# Patient Record
Sex: MALE | Race: WHITE | NOT HISPANIC OR LATINO | Employment: FULL TIME | URBAN - METROPOLITAN AREA
[De-identification: names, ages, dates, MRNs, and addresses within clinical notes are randomized per-mention and may not be internally consistent; named-entity substitution may affect disease eponyms.]

---

## 2017-01-11 ENCOUNTER — TELEPHONE (OUTPATIENT)
Dept: CARDIOLOGY | Facility: CLINIC | Age: 52
End: 2017-01-11

## 2017-01-23 ENCOUNTER — ANTI-COAG VISIT (OUTPATIENT)
Dept: CARDIOLOGY | Facility: CLINIC | Age: 52
End: 2017-01-23

## 2017-01-23 ENCOUNTER — TELEPHONE (OUTPATIENT)
Dept: CARDIOLOGY | Facility: CLINIC | Age: 52
End: 2017-01-23

## 2017-01-23 NOTE — LETTER
January 23, 2017    Mari Peters  [address]  [facility] [address]             [facility]  [address]  [facility] [address]  Phone: [phone]  Fax: [phone] Dear Mr. Peters:         The [facility] has been unsuccessful in getting in touch with you regarding your missed appointment for a PT/INR check. Your appointment is at least a month overdue. Despite phone calls and mail sent to your address on file, you have not called our clinic to reschedule your appointment. It is very important that you are monitored regularly while on Coumadin ( Warfarin).          You have been discharged from the Coumadin Clinic. Your physician has been notified of your discharge. If you still require monitoring for your Coumadin (Warfarin) therapy, please call your physician immediately so that you can resume regular monitoring. If the physician recommends that our clinic continue to monitor your Coumadin (Warfarin) therapy, a new enrollment form will be required before we can resume care. If you are no longer on Coumadin (Warfarin) or no longer require monitoring by our clinic, please contact our clinic so we may update or records.        If you have any questions or concerns, please don't hesitate to call.    Sincerely,        [facility] Staff

## 2017-01-23 NOTE — PROGRESS NOTES
After several unsuccessful attempts to contact patient by phone and mail, patient will be discharged from clinic, per non compliance protocol. Physician and patient will be notified.

## 2019-03-25 PROBLEM — M25.331 SCAPHOLUNATE INSTABILITY OF RIGHT WRIST: Status: ACTIVE | Noted: 2019-03-25

## 2019-03-25 PROBLEM — S69.91XA INJURY OF RIGHT WRIST: Status: ACTIVE | Noted: 2019-03-25

## 2019-03-29 PROBLEM — S63.8X1A TEAR OF RIGHT SCAPHOLUNATE LIGAMENT: Status: ACTIVE | Noted: 2019-03-29

## 2019-09-09 PROBLEM — S63.8X1A TEAR OF RIGHT SCAPHOLUNATE LIGAMENT: Status: RESOLVED | Noted: 2019-03-29 | Resolved: 2019-09-09

## 2020-07-22 ENCOUNTER — OFFICE VISIT (OUTPATIENT)
Dept: ORTHOPEDICS | Facility: CLINIC | Age: 55
End: 2020-07-22
Payer: OTHER GOVERNMENT

## 2020-07-22 ENCOUNTER — HOSPITAL ENCOUNTER (OUTPATIENT)
Dept: RADIOLOGY | Facility: HOSPITAL | Age: 55
Discharge: HOME | End: 2020-07-22
Attending: ORTHOPAEDIC SURGERY
Payer: OTHER GOVERNMENT

## 2020-07-22 VITALS
TEMPERATURE: 98 F | WEIGHT: 201.94 LBS | HEIGHT: 68 IN | BODY MASS INDEX: 30.61 KG/M2 | SYSTOLIC BLOOD PRESSURE: 118 MMHG | DIASTOLIC BLOOD PRESSURE: 83 MMHG | HEART RATE: 95 BPM

## 2020-07-22 DIAGNOSIS — S63.8X1S TEAR OF RIGHT SCAPHOLUNATE LIGAMENT, SEQUELA: ICD-10-CM

## 2020-07-22 DIAGNOSIS — M25.531 WRIST PAIN, ACUTE, RIGHT: ICD-10-CM

## 2020-07-22 DIAGNOSIS — S69.81XA INJURY OF TRIANGULAR FIBROCARTILAGE COMPLEX (TFCC) OF RIGHT WRIST, INITIAL ENCOUNTER: ICD-10-CM

## 2020-07-22 DIAGNOSIS — M25.531 WRIST PAIN, ACUTE, RIGHT: Primary | ICD-10-CM

## 2020-07-22 PROCEDURE — 99999 PR PBB SHADOW E&M-NEW PATIENT-LVL IV: ICD-10-PCS | Mod: PBBFAC,,, | Performed by: ORTHOPAEDIC SURGERY

## 2020-07-22 PROCEDURE — 99204 OFFICE O/P NEW MOD 45 MIN: CPT | Mod: PBBFAC,25,PN | Performed by: ORTHOPAEDIC SURGERY

## 2020-07-22 PROCEDURE — 73110 XR WRIST COMPLETE 3 VIEWS RIGHT: ICD-10-PCS | Mod: 26,RT,, | Performed by: RADIOLOGY

## 2020-07-22 PROCEDURE — 99204 OFFICE O/P NEW MOD 45 MIN: CPT | Mod: S$PBB,,, | Performed by: ORTHOPAEDIC SURGERY

## 2020-07-22 PROCEDURE — 73110 X-RAY EXAM OF WRIST: CPT | Mod: 26,RT,, | Performed by: RADIOLOGY

## 2020-07-22 PROCEDURE — 73110 X-RAY EXAM OF WRIST: CPT | Mod: TC,PO,RT

## 2020-07-22 PROCEDURE — 99204 PR OFFICE/OUTPT VISIT, NEW, LEVL IV, 45-59 MIN: ICD-10-PCS | Mod: S$PBB,,, | Performed by: ORTHOPAEDIC SURGERY

## 2020-07-22 PROCEDURE — 99999 PR PBB SHADOW E&M-NEW PATIENT-LVL IV: CPT | Mod: PBBFAC,,, | Performed by: ORTHOPAEDIC SURGERY

## 2020-07-22 NOTE — PROGRESS NOTES
7/22/2020    Chief Complaint:  Chief Complaint   Patient presents with    Right wrist pain     pt reports he had right scapholunate repair 3/25/20 by Dr. Jhaveri.       HPI:  Mari Peters is a 54 y.o. male, who presents to clinic today has a history of right scapholunate ligament injury.  This occurred approximately 2 years ago.  In March of 2019 he underwent a scapholunate ligament repair.  Since that time he has had difficulty with regaining range of motion and function.  He is still having severe limitation of motion and pain in the wrist.  He has undergone courses of therapy but his insurance has changed recently and he is trying to get further evaluation for his hand and wrist to assess for any further treatment options.    PMHX:  Past Medical History:   Diagnosis Date    Anticoagulant long-term use     eliquis    Colon polyps 2012    suspicious,  pending further w/u     Depression     Diabetes mellitus     Diabetes mellitus, type 2 2012    History of pulmonary embolism     HTN (hypertension) 2/7/2014    Hyperlipidemia     Hypothyroidism     Injury of right wrist 03/2019    Thromboembolism Jan 2014    bilateral LE blood clots, also diffuse pulmonary blood clots    Thyroid disease        PSHX:  Past Surgical History:   Procedure Laterality Date    ANKLE SURGERY Right     RECONSTRUCTION OF LIGAMENT Right 3/25/2019    Procedure: RIGHT SCAPHOLUNATE INTEROSSEOUS LIGAMENT RECONSTRUCTION with FLEXOR CARPI RADIALIS;  Surgeon: LALA Corbin MD;  Location: [facility];  Service: Orthopedics;  Laterality: Right;    TENDON TRANSFER Right 3/25/2019    Procedure: TRANSFER, TENDON/ FCR;  Surgeon: LALA Corbin MD;  Location: [facility];  Service: Orthopedics;  Laterality: Right;       FMHX:  Family History   Problem Relation Age of Onset    Diabetes Mother     Heart disease Mother     COPD Mother        SOCHX:  Social History     Tobacco Use    Smoking status: Never Smoker    Smokeless tobacco:  "Current User     Types: Chew    Tobacco comment: 2 pk per day of chew   Substance Use Topics    Alcohol use: Yes     Comment: 2-3 times per yr       ALLERGIES:  Bee sting [allergen ext-venom-honey bee]    CURRENT MEDICATIONS:  Current Outpatient Medications on File Prior to Visit   Medication Sig Dispense Refill    apixaban (ELIQUIS) 5 mg Tab Take 5 mg by mouth 2 (two) times daily. Stop per cardiologist instructions      atorvastatin (LIPITOR) 10 MG tablet Take 1 tablet (10 mg total) by mouth once daily. (Patient taking differently: Take 10 mg by mouth once daily. TAKE AM OF SURGERY WITH A SIP OF WATER) 30 tablet 6    blood sugar diagnostic Strp Test strips and lancets of pt choice 100 strip 12    dextroamphetamine-amphetamine (ADDERALL XR) 20 MG 24 hr capsule Take 20 mg by mouth daily as needed. HOLD AM OF SURGERY      ergocalciferol (ERGOCALCIFEROL) 50,000 unit Cap Take 50,000 Units by mouth every 7 days.      escitalopram oxalate (LEXAPRO) 10 MG tablet Take 1 tablet (10 mg total) by mouth once daily. (Patient taking differently: Take 10 mg by mouth once daily. TAKE AM OF SURGERY WITH A SIP OF WATER) 30 tablet 6    insulin (BASAGLAR KWIKPEN U-100 INSULIN) glargine 100 units/mL (3mL) SubQ pen Inject 20 Units into the skin once daily. 10 UNITS AM OF SURGERY      insulin lispro 100 unit/mL injection Inject into the skin 3 (three) times daily before meals. Sliding scale      insulin needles, disposable, (BD ULTRA-FINE FIGUEROA PEN NEEDLES) 32 x 5/32 " Ndle Pt uses with levemir and novolog up to QID. 100 each 3    lancets (ACCU-CHEK SOFTCLIX LANCETS) Misc 1 Bottle by Misc.(Non-Drug; Combo Route) route 3 (three) times daily as needed. 1 each 3    levothyroxine (SYNTHROID) 100 MCG tablet Take 100 mcg by mouth once daily. TAKE AM OF SURGERY WITH A SIP OF WATER      linagliptin (TRADJENTA) 5 mg Tab tablet Take 5 mg by mouth once daily. DO NOT TAKE NIGHT BEFORE OR AM OF SURGERY      lisinopril (PRINIVIL,ZESTRIL) " "5 MG tablet Take 2 tablets (10 mg total) by mouth once daily. (Patient taking differently: Take 10 mg by mouth once daily. DO NOT TAKE NIGHT BEFORE OR AM OF SURGERY) 30 tablet 11    traZODone (DESYREL) 50 MG tablet Take 50 mg by mouth nightly as needed for Insomnia.      gabapentin (NEURONTIN) 300 MG capsule Take 300 mg by mouth 3 (three) times daily. TAKE AM OF SURGERY WITH A SIP OF WATER      glipiZIDE (GLUCOTROL) 5 MG tablet Take 1 tablet (5 mg total) by mouth daily with breakfast. (Patient taking differently: Take 5 mg by mouth daily with breakfast. DO NOT TAKE NIGHT BEFORE OR AM OF SURGERY) 30 tablet 6     No current facility-administered medications on file prior to visit.        REVIEW OF SYSTEMS:  Review of Systems   Constitutional: Negative.    HENT: Negative.    Eyes: Negative.    Respiratory: Negative.    Cardiovascular: Negative.    Gastrointestinal: Negative.    Genitourinary: Negative.    Musculoskeletal: Positive for joint pain. Negative for back pain, falls, myalgias and neck pain.   Skin: Negative.    Neurological: Positive for tremors and focal weakness. Negative for dizziness, tingling, sensory change, speech change, seizures, loss of consciousness, weakness and headaches.   Endo/Heme/Allergies: Positive for polydipsia. Negative for environmental allergies. Bruises/bleeds easily.   Psychiatric/Behavioral: Negative for depression, hallucinations, memory loss, substance abuse and suicidal ideas. The patient is nervous/anxious and has insomnia.        GENERAL PHYSICAL EXAM:   /83   Pulse 95   Temp 98.2 °F (36.8 °C)   Ht 5' 8" (1.727 m)   Wt 91.6 kg (201 lb 15.1 oz)   BMI 30.71 kg/m²    GEN: well developed, well nourished, no acute distress   HENT: Normocephalic, atraumatic   EYES: No discharge, conjunctiva normal   NECK: Supple, non-tender   PULM: No wheezing, no respiratory distress   CV: RRR   ABD: Soft, non-tender    ORTHO EXAM:   Examination the right hand and wrist reveals that " there is a well-healed incision over the dorsum of the wrist.  There is no edema.  Palpation still produces global tenderness about the wrist which is most significant overlying the region of the distal TFCC and ulnar styloid.  He has mild radial sided tenderness noted.  Range of motion of the wrist is limited with extension of 60° and flexion of 50°.  He has 70° of supination and only 10° of pronation.  He is able make a full composite fist and fully extend the fingers.  He does report intact sensation in the median radial and ulnar distributions    RADIOLOGY:   X-rays of the right wrist were taken in clinic today.  The films were reviewed by me.  There is noted to be evidence of a scapholunate ligament repair.  There is unfortunately room repeat widening of the scapholunate interval.  There is also a DISI deformity noted.  There are some degenerative changes noted.  There is mild widening of the distal radial ulnar joint as well.  There is no significant dorsal displacement    ASSESSMENT:   Recurrent right scapholunate ligament tear, possible TFCC tear    PLAN:  1.  Will have the patient obtain his recent MRI for me to further evaluate prior to considering any treatment options    2.  I do feel like this patient has a significant injury to his wrist with severe limitation of motion that he may not recover from.  This may result in a permanent disability to his right hand and wrist    3.  Will follow up with me as soon as his MRI is available

## 2020-07-29 ENCOUNTER — OFFICE VISIT (OUTPATIENT)
Dept: ORTHOPEDICS | Facility: CLINIC | Age: 55
End: 2020-07-29
Payer: OTHER GOVERNMENT

## 2020-07-29 VITALS
DIASTOLIC BLOOD PRESSURE: 81 MMHG | HEIGHT: 68 IN | WEIGHT: 201.94 LBS | SYSTOLIC BLOOD PRESSURE: 114 MMHG | HEART RATE: 102 BPM | BODY MASS INDEX: 30.61 KG/M2

## 2020-07-29 DIAGNOSIS — S69.81XA INJURY OF TRIANGULAR FIBROCARTILAGE COMPLEX (TFCC) OF RIGHT WRIST, INITIAL ENCOUNTER: ICD-10-CM

## 2020-07-29 DIAGNOSIS — M25.331 SCAPHOLUNATE INSTABILITY OF RIGHT WRIST: Primary | ICD-10-CM

## 2020-07-29 PROCEDURE — 99213 OFFICE O/P EST LOW 20 MIN: CPT | Mod: PBBFAC,PN | Performed by: ORTHOPAEDIC SURGERY

## 2020-07-29 PROCEDURE — 99213 OFFICE O/P EST LOW 20 MIN: CPT | Mod: S$PBB,,, | Performed by: ORTHOPAEDIC SURGERY

## 2020-07-29 PROCEDURE — 99999 PR PBB SHADOW E&M-EST. PATIENT-LVL III: ICD-10-PCS | Mod: PBBFAC,,, | Performed by: ORTHOPAEDIC SURGERY

## 2020-07-29 PROCEDURE — 99999 PR PBB SHADOW E&M-EST. PATIENT-LVL III: CPT | Mod: PBBFAC,,, | Performed by: ORTHOPAEDIC SURGERY

## 2020-07-29 PROCEDURE — 99213 PR OFFICE/OUTPT VISIT, EST, LEVL III, 20-29 MIN: ICD-10-PCS | Mod: S$PBB,,, | Performed by: ORTHOPAEDIC SURGERY

## 2020-08-03 NOTE — PROGRESS NOTES
Mr Peters returns to clinic today.  Has a history of right wrist trauma which she sustained a scapholunate ligament tear TFCC tear.  He is returning for evaluation of his MRI discussion treatment options.  He still complains of right wrist and hand limited motion with limited strength as well physical exam:  Examination the right wrist hand reveals that there is well-healed incision.  There is no significant edema.  He does have prominence of the distal ulna styloid.  Palpation does produce tenderness overlying region of the distal ulna and TFCC.  He has mild radial sided tenderness.  Range of motion of the wrist with extension of 50° and flexion of 50°.  He has 70° of supination and 10° of pronation.  He is able make a full composite fist.  Sensation is grossly intact in the median radial ulnar distribution.  Has capillary refill less than 2 sec in all the digits.    Radiology:  MRI of right wrist has been reviewed.  There is noted to be significant widening of the scapholunate interval.  There is evidence a scapholunate ligament reconstruction.  There is some remaining tendon crossing the scapholunate interval but a significant portion appears to be torn or stretched.  There is also noted to be widening of the distal radial ulnar joint.  There is what appears to be a full-thickness tear of the TFCC as foveal attachment    Assessment:  Right wrist scapholunate ligament tear with DISI deformity, right wrist TFCC tear    Plan:    1.  Had a long discussion with the patient today about treatment options.  I have discussed attempt set proximal row carpectomy, wrist fusion, and continue conservative treatments.  Patient states that he is unsure as to whether he would like undergo any further surgical procedure.    2.  I do feel like the patient does have limitations to his right wrist which will limit his function.  He will most likely have limited function of the right wrist and hand despite further treatments.  It  may be beneficial to obtain either functional capacity exam or the patient decides not to proceed with any surgical procedure he may benefit an impairment rating.    3.  He will follow up with me on p.r.n. basis based off of his decision for further treatment.

## 2020-09-09 ENCOUNTER — OFFICE VISIT (OUTPATIENT)
Dept: ORTHOPEDICS | Facility: CLINIC | Age: 55
End: 2020-09-09
Payer: OTHER GOVERNMENT

## 2020-09-09 VITALS
HEART RATE: 83 BPM | SYSTOLIC BLOOD PRESSURE: 119 MMHG | BODY MASS INDEX: 30.61 KG/M2 | HEIGHT: 68 IN | WEIGHT: 201.94 LBS | DIASTOLIC BLOOD PRESSURE: 80 MMHG

## 2020-09-09 DIAGNOSIS — M25.331 SCAPHOLUNATE INSTABILITY OF RIGHT WRIST: Primary | ICD-10-CM

## 2020-09-09 DIAGNOSIS — S63.591D COMPLEX TEAR OF TRIANGULAR FIBROCARTILAGE OF RIGHT WRIST, SUBSEQUENT ENCOUNTER: ICD-10-CM

## 2020-09-09 PROCEDURE — 99999 PR PBB SHADOW E&M-EST. PATIENT-LVL III: CPT | Mod: PBBFAC,,, | Performed by: ORTHOPAEDIC SURGERY

## 2020-09-09 PROCEDURE — 99213 PR OFFICE/OUTPT VISIT, EST, LEVL III, 20-29 MIN: ICD-10-PCS | Mod: S$PBB,,, | Performed by: ORTHOPAEDIC SURGERY

## 2020-09-09 PROCEDURE — 99999 PR PBB SHADOW E&M-EST. PATIENT-LVL III: ICD-10-PCS | Mod: PBBFAC,,, | Performed by: ORTHOPAEDIC SURGERY

## 2020-09-09 PROCEDURE — 99213 OFFICE O/P EST LOW 20 MIN: CPT | Mod: PBBFAC,PN | Performed by: ORTHOPAEDIC SURGERY

## 2020-09-09 PROCEDURE — 99213 OFFICE O/P EST LOW 20 MIN: CPT | Mod: S$PBB,,, | Performed by: ORTHOPAEDIC SURGERY

## 2021-03-11 ENCOUNTER — OFFICE VISIT (OUTPATIENT)
Dept: ORTHOPEDICS | Facility: CLINIC | Age: 56
End: 2021-03-11
Payer: OTHER GOVERNMENT

## 2021-03-11 VITALS
WEIGHT: 201.81 LBS | HEIGHT: 68 IN | DIASTOLIC BLOOD PRESSURE: 89 MMHG | RESPIRATION RATE: 20 BRPM | SYSTOLIC BLOOD PRESSURE: 141 MMHG | BODY MASS INDEX: 30.59 KG/M2 | HEART RATE: 97 BPM

## 2021-03-11 DIAGNOSIS — M25.331 SCAPHOLUNATE INSTABILITY OF RIGHT WRIST: Primary | ICD-10-CM

## 2021-03-11 PROCEDURE — 99214 OFFICE O/P EST MOD 30 MIN: CPT | Mod: PBBFAC,PN | Performed by: ORTHOPAEDIC SURGERY

## 2021-03-11 PROCEDURE — 20605 INTERMEDIATE JOINT ASPIRATION/INJECTION: R RADIOCARPAL: ICD-10-PCS | Mod: S$PBB,RT,, | Performed by: ORTHOPAEDIC SURGERY

## 2021-03-11 PROCEDURE — 99999 PR PBB SHADOW E&M-EST. PATIENT-LVL IV: CPT | Mod: PBBFAC,,, | Performed by: ORTHOPAEDIC SURGERY

## 2021-03-11 PROCEDURE — 99999 PR PBB SHADOW E&M-EST. PATIENT-LVL IV: ICD-10-PCS | Mod: PBBFAC,,, | Performed by: ORTHOPAEDIC SURGERY

## 2021-03-11 PROCEDURE — 99213 OFFICE O/P EST LOW 20 MIN: CPT | Mod: 25,S$PBB,, | Performed by: ORTHOPAEDIC SURGERY

## 2021-03-11 PROCEDURE — 99213 PR OFFICE/OUTPT VISIT, EST, LEVL III, 20-29 MIN: ICD-10-PCS | Mod: 25,S$PBB,, | Performed by: ORTHOPAEDIC SURGERY

## 2021-03-11 PROCEDURE — 20605 DRAIN/INJ JOINT/BURSA W/O US: CPT | Mod: PBBFAC,PN | Performed by: ORTHOPAEDIC SURGERY

## 2021-03-11 RX ADMIN — TRIAMCINOLONE ACETONIDE 40 MG: 40 INJECTION, SUSPENSION INTRA-ARTICULAR; INTRAMUSCULAR at 11:03

## 2021-03-12 RX ORDER — TRIAMCINOLONE ACETONIDE 40 MG/ML
40 INJECTION, SUSPENSION INTRA-ARTICULAR; INTRAMUSCULAR
Status: DISCONTINUED | OUTPATIENT
Start: 2021-03-11 | End: 2021-03-12 | Stop reason: HOSPADM

## 2021-03-31 ENCOUNTER — TELEPHONE (OUTPATIENT)
Dept: ORTHOPEDICS | Facility: CLINIC | Age: 56
End: 2021-03-31

## 2021-04-07 ENCOUNTER — TELEPHONE (OUTPATIENT)
Dept: ORTHOPEDICS | Facility: CLINIC | Age: 56
End: 2021-04-07

## 2021-05-04 ENCOUNTER — PATIENT MESSAGE (OUTPATIENT)
Dept: RESEARCH | Facility: HOSPITAL | Age: 56
End: 2021-05-04

## 2021-07-01 ENCOUNTER — OFFICE VISIT (OUTPATIENT)
Dept: ENDOCRINOLOGY | Facility: CLINIC | Age: 56
End: 2021-07-01
Payer: OTHER GOVERNMENT

## 2021-07-01 VITALS
HEIGHT: 68 IN | BODY MASS INDEX: 30.69 KG/M2 | SYSTOLIC BLOOD PRESSURE: 124 MMHG | OXYGEN SATURATION: 98 % | HEART RATE: 82 BPM | DIASTOLIC BLOOD PRESSURE: 86 MMHG

## 2021-07-01 DIAGNOSIS — E11.65 TYPE 2 DIABETES MELLITUS WITH HYPERGLYCEMIA, WITH LONG-TERM CURRENT USE OF INSULIN: Primary | ICD-10-CM

## 2021-07-01 DIAGNOSIS — E03.9 ACQUIRED HYPOTHYROIDISM: ICD-10-CM

## 2021-07-01 DIAGNOSIS — Z79.4 TYPE 2 DIABETES MELLITUS WITH HYPERGLYCEMIA, WITH LONG-TERM CURRENT USE OF INSULIN: Primary | ICD-10-CM

## 2021-07-01 DIAGNOSIS — E78.5 HYPERLIPIDEMIA LDL GOAL <70: ICD-10-CM

## 2021-07-01 DIAGNOSIS — I10 ESSENTIAL HYPERTENSION: ICD-10-CM

## 2021-07-01 PROCEDURE — 99214 OFFICE O/P EST MOD 30 MIN: CPT | Mod: PBBFAC,PO | Performed by: INTERNAL MEDICINE

## 2021-07-01 PROCEDURE — 99205 OFFICE O/P NEW HI 60 MIN: CPT | Mod: S$PBB,,, | Performed by: INTERNAL MEDICINE

## 2021-07-01 PROCEDURE — 99205 PR OFFICE/OUTPT VISIT, NEW, LEVL V, 60-74 MIN: ICD-10-PCS | Mod: S$PBB,,, | Performed by: INTERNAL MEDICINE

## 2021-07-01 PROCEDURE — 99999 PR PBB SHADOW E&M-EST. PATIENT-LVL IV: ICD-10-PCS | Mod: PBBFAC,,, | Performed by: INTERNAL MEDICINE

## 2021-07-01 PROCEDURE — 99999 PR PBB SHADOW E&M-EST. PATIENT-LVL IV: CPT | Mod: PBBFAC,,, | Performed by: INTERNAL MEDICINE

## 2021-07-01 RX ORDER — ALOGLIPTIN 25 MG/1
25 TABLET, FILM COATED ORAL DAILY
Qty: 90 TABLET | Refills: 3 | Status: SHIPPED | OUTPATIENT
Start: 2021-07-01

## 2021-07-01 RX ORDER — INSULIN GLARGINE 100 [IU]/ML
30 INJECTION, SOLUTION SUBCUTANEOUS DAILY
Qty: 45 ML | Refills: 4
Start: 2021-07-01

## 2021-07-01 RX ORDER — PEN NEEDLE, DIABETIC 30 GX3/16"
NEEDLE, DISPOSABLE MISCELLANEOUS
Qty: 400 EACH | Refills: 4 | Status: SHIPPED | OUTPATIENT
Start: 2021-07-01

## 2021-07-01 RX ORDER — INSULIN ASPART 100 [IU]/ML
INJECTION, SOLUTION INTRAVENOUS; SUBCUTANEOUS
Qty: 30 ML | Refills: 5 | Status: SHIPPED | OUTPATIENT
Start: 2021-07-01

## 2021-07-01 RX ORDER — DAPAGLIFLOZIN 5 MG/1
5 TABLET, FILM COATED ORAL DAILY
Qty: 90 TABLET | Refills: 3 | Status: SHIPPED | OUTPATIENT
Start: 2021-07-01

## 2021-07-01 RX ORDER — INSULIN LISPRO 100 [IU]/ML
INJECTION, SOLUTION INTRAVENOUS; SUBCUTANEOUS
Qty: 30 ML | Refills: 5 | Status: SHIPPED | OUTPATIENT
Start: 2021-07-01 | End: 2021-07-01

## 2021-07-01 RX ORDER — INSULIN GLARGINE 100 [IU]/ML
30 INJECTION, SOLUTION SUBCUTANEOUS DAILY
Qty: 45 ML | Refills: 4 | Status: SHIPPED | OUTPATIENT
Start: 2021-07-01 | End: 2021-07-01

## 2021-07-07 ENCOUNTER — TELEPHONE (OUTPATIENT)
Dept: ADMINISTRATIVE | Facility: HOSPITAL | Age: 56
End: 2021-07-07

## 2021-09-22 ENCOUNTER — PATIENT MESSAGE (OUTPATIENT)
Dept: ENDOCRINOLOGY | Facility: CLINIC | Age: 56
End: 2021-09-22

## 2021-09-22 ENCOUNTER — TELEPHONE (OUTPATIENT)
Dept: ENDOCRINOLOGY | Facility: CLINIC | Age: 56
End: 2021-09-22

## 2021-11-01 ENCOUNTER — OFFICE VISIT (OUTPATIENT)
Dept: ORTHOPEDICS | Facility: CLINIC | Age: 56
End: 2021-11-01
Payer: OTHER GOVERNMENT

## 2021-11-01 ENCOUNTER — HOSPITAL ENCOUNTER (OUTPATIENT)
Dept: RADIOLOGY | Facility: HOSPITAL | Age: 56
Discharge: HOME | End: 2021-11-01
Attending: ORTHOPAEDIC SURGERY
Payer: OTHER GOVERNMENT

## 2021-11-01 VITALS
HEART RATE: 120 BPM | BODY MASS INDEX: 30.46 KG/M2 | SYSTOLIC BLOOD PRESSURE: 132 MMHG | HEIGHT: 68 IN | DIASTOLIC BLOOD PRESSURE: 82 MMHG | WEIGHT: 201 LBS

## 2021-11-01 DIAGNOSIS — M19.131 SLAC (SCAPHOLUNATE ADVANCED COLLAPSE) OF WRIST, RIGHT: ICD-10-CM

## 2021-11-01 DIAGNOSIS — M19.131 SLAC (SCAPHOLUNATE ADVANCED COLLAPSE) OF WRIST, RIGHT: Primary | ICD-10-CM

## 2021-11-01 PROCEDURE — 20605 DRAIN/INJ JOINT/BURSA W/O US: CPT | Mod: PBBFAC,PN,RT | Performed by: ORTHOPAEDIC SURGERY

## 2021-11-01 PROCEDURE — 99999 PR PBB SHADOW E&M-EST. PATIENT-LVL IV: CPT | Mod: PBBFAC,,, | Performed by: ORTHOPAEDIC SURGERY

## 2021-11-01 PROCEDURE — 99214 OFFICE O/P EST MOD 30 MIN: CPT | Mod: PBBFAC,PN,25 | Performed by: ORTHOPAEDIC SURGERY

## 2021-11-01 PROCEDURE — 99214 PR OFFICE/OUTPT VISIT, EST, LEVL IV, 30-39 MIN: ICD-10-PCS | Mod: 25,S$PBB,, | Performed by: ORTHOPAEDIC SURGERY

## 2021-11-01 PROCEDURE — 99999 PR PBB SHADOW E&M-EST. PATIENT-LVL IV: ICD-10-PCS | Mod: PBBFAC,,, | Performed by: ORTHOPAEDIC SURGERY

## 2021-11-01 PROCEDURE — 20605 INTERMEDIATE JOINT ASPIRATION/INJECTION: R RADIOCARPAL: ICD-10-PCS | Mod: S$PBB,RT,, | Performed by: ORTHOPAEDIC SURGERY

## 2021-11-01 PROCEDURE — 73110 X-RAY EXAM OF WRIST: CPT | Mod: TC,PO,RT

## 2021-11-01 PROCEDURE — 73110 XR WRIST COMPLETE 3 VIEWS RIGHT: ICD-10-PCS | Mod: 26,RT,, | Performed by: RADIOLOGY

## 2021-11-01 PROCEDURE — 99214 OFFICE O/P EST MOD 30 MIN: CPT | Mod: 25,S$PBB,, | Performed by: ORTHOPAEDIC SURGERY

## 2021-11-01 PROCEDURE — 73110 X-RAY EXAM OF WRIST: CPT | Mod: 26,RT,, | Performed by: RADIOLOGY

## 2021-11-01 RX ORDER — TRIAMCINOLONE ACETONIDE 40 MG/ML
40 INJECTION, SUSPENSION INTRA-ARTICULAR; INTRAMUSCULAR
Status: DISCONTINUED | OUTPATIENT
Start: 2021-11-01 | End: 2021-11-01 | Stop reason: HOSPADM

## 2021-11-01 RX ADMIN — TRIAMCINOLONE ACETONIDE 40 MG: 40 INJECTION, SUSPENSION INTRA-ARTICULAR; INTRAMUSCULAR at 11:11

## 2022-03-21 ENCOUNTER — OFFICE VISIT (OUTPATIENT)
Dept: ORTHOPEDICS | Facility: CLINIC | Age: 57
End: 2022-03-21
Payer: OTHER GOVERNMENT

## 2022-03-21 VITALS
HEART RATE: 118 BPM | BODY MASS INDEX: 30.47 KG/M2 | HEIGHT: 68 IN | SYSTOLIC BLOOD PRESSURE: 126 MMHG | DIASTOLIC BLOOD PRESSURE: 87 MMHG | WEIGHT: 201.06 LBS

## 2022-03-21 DIAGNOSIS — M19.131 SCAPHOLUNATE ADVANCED COLLAPSE OF RIGHT WRIST: Primary | ICD-10-CM

## 2022-03-21 PROCEDURE — 99999 PR PBB SHADOW E&M-EST. PATIENT-LVL IV: ICD-10-PCS | Mod: PBBFAC,,, | Performed by: ORTHOPAEDIC SURGERY

## 2022-03-21 PROCEDURE — 99214 OFFICE O/P EST MOD 30 MIN: CPT | Mod: PBBFAC,PN,25 | Performed by: ORTHOPAEDIC SURGERY

## 2022-03-21 PROCEDURE — 20605 INTERMEDIATE JOINT ASPIRATION/INJECTION: R RADIOCARPAL: ICD-10-PCS | Mod: S$PBB,RT,, | Performed by: ORTHOPAEDIC SURGERY

## 2022-03-21 PROCEDURE — 99213 OFFICE O/P EST LOW 20 MIN: CPT | Mod: S$PBB,25,, | Performed by: ORTHOPAEDIC SURGERY

## 2022-03-21 PROCEDURE — 20605 DRAIN/INJ JOINT/BURSA W/O US: CPT | Mod: PBBFAC,PN | Performed by: ORTHOPAEDIC SURGERY

## 2022-03-21 PROCEDURE — 99999 PR PBB SHADOW E&M-EST. PATIENT-LVL IV: CPT | Mod: PBBFAC,,, | Performed by: ORTHOPAEDIC SURGERY

## 2022-03-21 PROCEDURE — 99213 PR OFFICE/OUTPT VISIT, EST, LEVL III, 20-29 MIN: ICD-10-PCS | Mod: S$PBB,25,, | Performed by: ORTHOPAEDIC SURGERY

## 2022-03-21 RX ADMIN — TRIAMCINOLONE ACETONIDE 40 MG: 40 INJECTION, SUSPENSION INTRA-ARTICULAR; INTRAMUSCULAR at 11:03

## 2022-03-23 RX ORDER — TRIAMCINOLONE ACETONIDE 40 MG/ML
40 INJECTION, SUSPENSION INTRA-ARTICULAR; INTRAMUSCULAR
Status: DISCONTINUED | OUTPATIENT
Start: 2022-03-21 | End: 2022-03-23 | Stop reason: HOSPADM

## 2022-03-23 NOTE — PROGRESS NOTES
Mr. Peters returns to clinic today.  Has a history of right wrist scapholunate advanced collapse.  He has been injected in the past.  He still having pain to the right wrist.  He is here today to discuss other treatments.    Physical exam:  Examination of the right wrist and hand reveals that there is obvious area of arthritis about the wrist is there is swelling over the radial carpal joint.  Palpation in the joint does produce moderate tenderness.  Wrist range of motion is limited with extension of 50° and flexion of 40°.  He is able to pronate and supinate.  He is grossly neurovascularly intact.    Radiology:  X-rays from 11/01/2021 have been reviewed.  He is noted to have stage III scapholunate advanced collapse    Plan:    1. I have discussed treatment options with the patient.  I did discuss the possibility of a proximal row carpectomy versus wrist arthrodesis.  The patient states that he is not ready for surgery at this time    2. After informed consent was obtained and injection was placed to the right wrist radiocarpal joint.  The patient tolerated that well.    3. Will follow up with me on a p.r.n. basis

## 2022-03-23 NOTE — PROCEDURES
Intermediate Joint Aspiration/Injection: R radiocarpal    Date/Time: 3/21/2022 11:20 AM  Performed by: Gómez Hernandez MD  Authorized by: Gómez Hernandez MD     Indications:  Arthritis  Site marked: The procedure site was marked    Timeout: Prior to procedure the correct patient, procedure, and site was verified      Location:  Wrist  Site:  R radiocarpal  Prep: Patient was prepped and draped in usual sterile fashion    Needle size:  25 G  Medications:  40 mg triamcinolone acetonide 40 mg/mL  Patient tolerance:  Patient tolerated the procedure well with no immediate complications

## 2022-05-09 ENCOUNTER — TELEPHONE (OUTPATIENT)
Dept: ORTHOPEDICS | Facility: CLINIC | Age: 57
End: 2022-05-09
Payer: OTHER GOVERNMENT

## 2022-05-09 NOTE — TELEPHONE ENCOUNTER
----- Message from Panfilo Stone MA sent at 5/9/2022  1:45 PM CDT -----  Contact: Mari Peters  Please call patient back at Contact Information   [phone] ([employer]) in regards to some paperwork..

## 2022-06-27 ENCOUNTER — OFFICE VISIT (OUTPATIENT)
Dept: ORTHOPEDICS | Facility: CLINIC | Age: 57
End: 2022-06-27
Payer: OTHER GOVERNMENT

## 2022-06-27 VITALS — BODY MASS INDEX: 30.47 KG/M2 | HEIGHT: 68 IN | WEIGHT: 201.06 LBS

## 2022-06-27 DIAGNOSIS — M19.131 SLAC (SCAPHOLUNATE ADVANCED COLLAPSE) OF WRIST, RIGHT: Primary | ICD-10-CM

## 2022-06-27 PROCEDURE — 20605 DRAIN/INJ JOINT/BURSA W/O US: CPT | Mod: PBBFAC,PN | Performed by: ORTHOPAEDIC SURGERY

## 2022-06-27 PROCEDURE — 99213 OFFICE O/P EST LOW 20 MIN: CPT | Mod: PBBFAC,PN | Performed by: ORTHOPAEDIC SURGERY

## 2022-06-27 PROCEDURE — 99999 PR PBB SHADOW E&M-EST. PATIENT-LVL III: CPT | Mod: PBBFAC,,, | Performed by: ORTHOPAEDIC SURGERY

## 2022-06-27 PROCEDURE — 99999 PR PBB SHADOW E&M-EST. PATIENT-LVL III: ICD-10-PCS | Mod: PBBFAC,,, | Performed by: ORTHOPAEDIC SURGERY

## 2022-06-27 PROCEDURE — 20605 INTERMEDIATE JOINT ASPIRATION/INJECTION: R RADIOCARPAL: ICD-10-PCS | Mod: S$PBB,RT,, | Performed by: ORTHOPAEDIC SURGERY

## 2022-06-27 PROCEDURE — 99213 OFFICE O/P EST LOW 20 MIN: CPT | Mod: S$PBB,25,, | Performed by: ORTHOPAEDIC SURGERY

## 2022-06-27 PROCEDURE — 99213 PR OFFICE/OUTPT VISIT, EST, LEVL III, 20-29 MIN: ICD-10-PCS | Mod: S$PBB,25,, | Performed by: ORTHOPAEDIC SURGERY

## 2022-06-27 RX ORDER — DABIGATRAN ETEXILATE 150 MG/1
150 CAPSULE ORAL
COMMUNITY

## 2022-06-27 RX ORDER — TRIAMCINOLONE ACETONIDE 40 MG/ML
40 INJECTION, SUSPENSION INTRA-ARTICULAR; INTRAMUSCULAR
Status: DISCONTINUED | OUTPATIENT
Start: 2022-06-27 | End: 2022-06-27 | Stop reason: HOSPADM

## 2022-06-27 RX ADMIN — TRIAMCINOLONE ACETONIDE 40 MG: 40 INJECTION, SUSPENSION INTRA-ARTICULAR; INTRAMUSCULAR at 02:06

## 2022-06-27 NOTE — PROCEDURES
Intermediate Joint Aspiration/Injection: R radiocarpal    Date/Time: 6/27/2022 2:00 PM  Performed by: Gómez Hernandez MD  Authorized by: Gómez Hernandez MD     Indications:  Arthritis  Site marked: The procedure site was marked    Timeout: Prior to procedure the correct patient, procedure, and site was verified      Location:  Wrist  Site:  R radiocarpal  Prep: Patient was prepped and draped in usual sterile fashion    Needle size:  25 G  Medications:  40 mg triamcinolone acetonide 40 mg/mL  Patient tolerance:  Patient tolerated the procedure well with no immediate complications

## 2022-06-27 NOTE — PROGRESS NOTES
Mr. Peters returns to clinic today.  Has a history of right wrist scapholunate advanced collapse.  He has been injected in the past.  He is here today for repeat injection is he states that his pain is returning    Physical exam:  Examination the right wrist and hand reveals that there is a well-healed incision.  There is minimal edema.  There is no erythema.  Palpation does produce tenderness over the radiocarpal joint.  Range of motion is limited with extension of 30° and flexion of 20°.  He is able to pronate and supinate.  He is neurovascularly intact distally.    Assessment:  Right wrist scapholunate advanced collapse    Plan:    1. After informed consent was obtained injection was placed to the right wrist.  The patient tolerated that well    2. Will follow up with me on a p.r.n. basis

## 2022-08-04 ENCOUNTER — TELEPHONE (OUTPATIENT)
Dept: ORTHOPEDICS | Facility: CLINIC | Age: 57
End: 2022-08-04
Payer: OTHER GOVERNMENT

## 2022-08-22 ENCOUNTER — HOSPITAL ENCOUNTER (OUTPATIENT)
Dept: RADIOLOGY | Facility: HOSPITAL | Age: 57
Discharge: HOME | End: 2022-08-22
Attending: ORTHOPAEDIC SURGERY
Payer: OTHER GOVERNMENT

## 2022-08-22 ENCOUNTER — OFFICE VISIT (OUTPATIENT)
Dept: ORTHOPEDICS | Facility: CLINIC | Age: 57
End: 2022-08-22
Payer: OTHER GOVERNMENT

## 2022-08-22 VITALS — HEIGHT: 68 IN | WEIGHT: 201.06 LBS | BODY MASS INDEX: 30.47 KG/M2

## 2022-08-22 DIAGNOSIS — M25.531 RIGHT WRIST PAIN: ICD-10-CM

## 2022-08-22 DIAGNOSIS — Z01.818 PREOPERATIVE EXAMINATION: ICD-10-CM

## 2022-08-22 DIAGNOSIS — M25.531 RIGHT WRIST PAIN: Primary | ICD-10-CM

## 2022-08-22 DIAGNOSIS — Z01.818 PREOPERATIVE EXAMINATION: Primary | ICD-10-CM

## 2022-08-22 DIAGNOSIS — M19.131 SLAC (SCAPHOLUNATE ADVANCED COLLAPSE) OF WRIST, RIGHT: Primary | ICD-10-CM

## 2022-08-22 PROCEDURE — 99213 OFFICE O/P EST LOW 20 MIN: CPT | Mod: PBBFAC,PN | Performed by: ORTHOPAEDIC SURGERY

## 2022-08-22 PROCEDURE — 73110 XR WRIST COMPLETE 3 VIEWS RIGHT: ICD-10-PCS | Mod: 26,RT,, | Performed by: RADIOLOGY

## 2022-08-22 PROCEDURE — 99999 PR PBB SHADOW E&M-EST. PATIENT-LVL III: ICD-10-PCS | Mod: PBBFAC,,, | Performed by: ORTHOPAEDIC SURGERY

## 2022-08-22 PROCEDURE — 73110 X-RAY EXAM OF WRIST: CPT | Mod: TC,PO,RT

## 2022-08-22 PROCEDURE — 99214 OFFICE O/P EST MOD 30 MIN: CPT | Mod: S$PBB,,, | Performed by: ORTHOPAEDIC SURGERY

## 2022-08-22 PROCEDURE — 73110 X-RAY EXAM OF WRIST: CPT | Mod: 26,RT,, | Performed by: RADIOLOGY

## 2022-08-22 PROCEDURE — 99999 PR PBB SHADOW E&M-EST. PATIENT-LVL III: CPT | Mod: PBBFAC,,, | Performed by: ORTHOPAEDIC SURGERY

## 2022-08-22 PROCEDURE — 99214 PR OFFICE/OUTPT VISIT, EST, LEVL IV, 30-39 MIN: ICD-10-PCS | Mod: S$PBB,,, | Performed by: ORTHOPAEDIC SURGERY

## 2022-08-22 NOTE — LETTER
August 22, 2022      [facility]  [address]  [place] [address]  Phone: [phone]       Patient: Mari Peters   YOB: 1965  Date of Visit: 08/22/2022    To Whom It May Concern:    Saman Peters is going to have a right wrist proximal row carpectomy with MD Edgar under general anesthesia, date of service pending clearance.  is requesting clearance for patient to hold blood thinners 5 days prior to surgery, and is needing clearance faxed to [phone] once obtained.     Sincerely,    Destiney Quispe LPN

## 2022-08-22 NOTE — PROGRESS NOTES
8/22/2022    Chief Complaint:  Chief Complaint   Patient presents with    Right Wrist - Pain       HPI:  Mari Pteers is a 56 y.o. male, who presents to clinic today has a history of right wrist scapholunate advanced collapse.  Had previous surgery to attempt to repair this.  He continues to have pain and worsening arthritis in his wrist.  We have tried conservative treatments.  He is here today to discuss further treatment options including surgical fixes.    PMHX:  Past Medical History:   Diagnosis Date    Anticoagulant long-term use     eliquis    Colon polyps 2012    suspicious,  pending further w/u     Depression     Diabetes mellitus     Diabetes mellitus, type 2 2012    History of pulmonary embolism     HTN (hypertension) 2/7/2014    Hyperlipidemia     Hypothyroidism     Injury of right wrist 03/2019    Thromboembolism Jan 2014    bilateral LE blood clots, also diffuse pulmonary blood clots    Thyroid disease        PSHX:  Past Surgical History:   Procedure Laterality Date    ANKLE SURGERY Right     RECONSTRUCTION OF LIGAMENT Right 3/25/2019    Procedure: RIGHT SCAPHOLUNATE INTEROSSEOUS LIGAMENT RECONSTRUCTION with FLEXOR CARPI RADIALIS;  Surgeon: LALA Corbin MD;  Location: [facility];  Service: Orthopedics;  Laterality: Right;    TENDON TRANSFER Right 3/25/2019    Procedure: TRANSFER, TENDON/ FCR;  Surgeon: LALA Corbin MD;  Location: [facility];  Service: Orthopedics;  Laterality: Right;       FMHX:  Family History   Problem Relation Age of Onset    Diabetes Mother     Heart disease Mother     COPD Mother        SOCHX:  Social History     Tobacco Use    Smoking status: Never Smoker    Smokeless tobacco: Current User     Types: Chew    Tobacco comment: 2 pk per day of chew   Substance Use Topics    Alcohol use: Yes     Comment: 2-3 times per yr       ALLERGIES:  Bee sting [allergen ext-venom-honey bee]    CURRENT MEDICATIONS:  Current Outpatient Medications on File Prior to  Visit   Medication Sig Dispense Refill    alogliptin (NESINA) 25 mg Tab Take 25 mg by mouth once daily. 90 tablet 3    apixaban (ELIQUIS) 5 mg Tab Take 5 mg by mouth 2 (two) times daily. Stop per cardiologist instructions      atorvastatin (LIPITOR) 10 MG tablet Take 1 tablet (10 mg total) by mouth once daily. (Patient taking differently: Take 10 mg by mouth once daily. TAKE AM OF SURGERY WITH A SIP OF WATER) 30 tablet 6    blood sugar diagnostic Strp Test strips and lancets of pt choice 100 strip 12    dabigatran etexilate (PRADAXA) 150 mg Cap Take 150 mg by mouth.      dapagliflozin (FARXIGA) 5 mg Tab tablet Take 1 tablet (5 mg total) by mouth once daily. 90 tablet 3    dextroamphetamine-amphetamine (ADDERALL XR) 20 MG 24 hr capsule Take 20 mg by mouth daily as needed. HOLD AM OF SURGERY      ergocalciferol (ERGOCALCIFEROL) 50,000 unit Cap Take 50,000 Units by mouth every 7 days.      escitalopram oxalate (LEXAPRO) 10 MG tablet Take 1 tablet (10 mg total) by mouth once daily. (Patient taking differently: Take 10 mg by mouth once daily. TAKE AM OF SURGERY WITH A SIP OF WATER) 30 tablet 6    gabapentin (NEURONTIN) 300 MG capsule Take 300 mg by mouth 3 (three) times daily. TAKE AM OF SURGERY WITH A SIP OF WATER      insulin (BASAGLAR KWIKPEN U-100 INSULIN) glargine 100 units/mL (3mL) SubQ pen Inject 30 Units into the skin once daily. 45 mL 4    insulin aspart U-100 (NOVOLOG FLEXPEN U-100 INSULIN) 100 unit/mL (3 mL) InPn pen Take 10 units with meals TID and correctional insulin 2 units for every 50 mg/DL over 150. TDD not to exceed 50. 30 mL 5    lancets (ACCU-CHEK SOFTCLIX LANCETS) Misc 1 Bottle by Misc.(Non-Drug; Combo Route) route 3 (three) times daily as needed. 1 each 3    levothyroxine (SYNTHROID) 100 MCG tablet Take 100 mcg by mouth once daily. TAKE AM OF SURGERY WITH A SIP OF WATER      lisinopril (PRINIVIL,ZESTRIL) 5 MG tablet Take 2 tablets (10 mg total) by mouth once daily. (Patient taking  "differently: Take 10 mg by mouth once daily. DO NOT TAKE NIGHT BEFORE OR AM OF SURGERY) 30 tablet 11    pen needle, diabetic (BD ULTRA-FINE FIGUEROA PEN NEEDLE) 32 gauge x 5/32" Ndle Use with insulin 4 x daily. 400 each 4    traZODone (DESYREL) 50 MG tablet Take 50 mg by mouth nightly as needed for Insomnia.       No current facility-administered medications on file prior to visit.       REVIEW OF SYSTEMS:  ROS    GENERAL PHYSICAL EXAM:   Ht 5' 8" (1.727 m)   Wt 91.2 kg (201 lb 1 oz)   BMI 30.57 kg/m²    GEN: well developed, well nourished, no acute distress   HENT: Normocephalic, atraumatic   EYES: No discharge, conjunctiva normal   NECK: Supple, non-tender   PULM: No wheezing, no respiratory distress   CV: RRR   ABD: Soft, non-tender    ORTHO EXAM:   Examination of the right wrist and hand reveals that there is well-healed incision over the dorsum of the wrist.  There is no edema.  Palpation does produce tenderness over the carpus.  Range of motion of the wrist is extension of 50° and flexion of 40°.  He is able to pronate and supinate.  He does have sensation intact in the median radial ulnar distribution capillary refill is less than 2 seconds in all the digits.    RADIOLOGY:   X-rays of the right wrist were taken in clinic today.  The films reviewed by me.  There is noted to be widening of the scapholunate interval.  There is arthritis at the radiocarpal joint over both the scaphoid and lunate.  There are significant cystic change within the scaphoid and some increase in sclerosis about the lunate which could be the result of avascular necrosis.    ASSESSMENT:   Right wrist scapholunate advanced collapse    PLAN:  1. I have discussed treatment options with the patient.  I did discuss the possibility of proximal row carpectomy versus scaphoid excision and 4 corner fusion.  After discussion of both treatment options the patient has elected to undergo right wrist proximal row carpectomy.  I did discuss the risks " and benefits of the procedure.    2.  Patient will have to be off of his blood thinner for 3 days prior to the surgery but can start back the day after     3. [facility] will [facility] to [facility] with this [facility]    4. Based off of the clearance we will schedule him for right wrist proximal row carpectomy under general anesthesia with a single-shot supraclavicular block

## 2023-01-04 ENCOUNTER — TELEPHONE (OUTPATIENT)
Dept: ORTHOPEDICS | Facility: CLINIC | Age: 58
End: 2023-01-04
Payer: OTHER GOVERNMENT

## 2023-01-04 NOTE — TELEPHONE ENCOUNTER
----- Message from Damon Lacy MA sent at 1/4/2023 11:21 AM CST -----  Contact: patient  Patient checking status of paperwork (disability) dropped off last week to office.    Call back number is [phone]

## 2023-01-04 NOTE — TELEPHONE ENCOUNTER
Called patient and attempted to ask her about paperwork dropped off.  Priya seen pt since august and no future appointments.  Unable to leave message.

## 2023-02-22 ENCOUNTER — TELEPHONE (OUTPATIENT)
Dept: ORTHOPEDICS | Facility: CLINIC | Age: 58
End: 2023-02-22
Payer: OTHER GOVERNMENT

## 2023-02-22 NOTE — TELEPHONE ENCOUNTER
----- Message from Cathy Wood MA sent at 2/22/2023 12:57 PM CST -----  Contact: pt  Needs to schedule surgery   Call back

## 2023-02-27 DIAGNOSIS — M19.131 SLAC (SCAPHOLUNATE ADVANCED COLLAPSE) OF WRIST, RIGHT: Primary | ICD-10-CM

## 2023-02-27 RX ORDER — MUPIROCIN 20 MG/G
OINTMENT TOPICAL
Status: CANCELLED | OUTPATIENT
Start: 2023-02-27

## 2023-03-02 RX ORDER — SEMAGLUTIDE 1.34 MG/ML
INJECTION, SOLUTION SUBCUTANEOUS
COMMUNITY

## 2023-03-06 ENCOUNTER — ANESTHESIA EVENT (OUTPATIENT)
Dept: SURGERY | Facility: HOSPITAL | Age: 58
End: 2023-03-06
Payer: OTHER GOVERNMENT

## 2023-03-07 ENCOUNTER — HOSPITAL ENCOUNTER (OUTPATIENT)
Dept: RADIOLOGY | Facility: HOSPITAL | Age: 58
Discharge: HOME | End: 2023-03-07
Attending: ORTHOPAEDIC SURGERY | Admitting: ORTHOPAEDIC SURGERY
Payer: OTHER GOVERNMENT

## 2023-03-07 ENCOUNTER — HOSPITAL ENCOUNTER (OUTPATIENT)
Facility: HOSPITAL | Age: 58
Discharge: HOME | End: 2023-03-07
Attending: ORTHOPAEDIC SURGERY | Admitting: ORTHOPAEDIC SURGERY
Payer: OTHER GOVERNMENT

## 2023-03-07 ENCOUNTER — ANESTHESIA (OUTPATIENT)
Dept: SURGERY | Facility: HOSPITAL | Age: 58
End: 2023-03-07
Payer: OTHER GOVERNMENT

## 2023-03-07 DIAGNOSIS — M25.531 RIGHT WRIST PAIN: ICD-10-CM

## 2023-03-07 DIAGNOSIS — M19.131 SLAC (SCAPHOLUNATE ADVANCED COLLAPSE) OF WRIST, RIGHT: ICD-10-CM

## 2023-03-07 LAB — GLUCOSE SERPL-MCNC: 97 MG/DL (ref 70–110)

## 2023-03-07 PROCEDURE — 25215 REMOVAL OF WRIST BONES: CPT | Mod: RT,,, | Performed by: ORTHOPAEDIC SURGERY

## 2023-03-07 PROCEDURE — 76000 FLUOROSCOPY <1 HR PHYS/QHP: CPT | Mod: TC,PO

## 2023-03-07 PROCEDURE — 71000015 HC POSTOP RECOV 1ST HR: Mod: PO | Performed by: ORTHOPAEDIC SURGERY

## 2023-03-07 PROCEDURE — D9220A PRA ANESTHESIA: ICD-10-PCS | Mod: ANES,,, | Performed by: ANESTHESIOLOGY

## 2023-03-07 PROCEDURE — 88305 TISSUE EXAM BY PATHOLOGIST: CPT | Performed by: PATHOLOGY

## 2023-03-07 PROCEDURE — C9290 INJ, BUPIVACAINE LIPOSOME: HCPCS | Mod: PO | Performed by: ANESTHESIOLOGY

## 2023-03-07 PROCEDURE — 25000003 PHARM REV CODE 250: Mod: PO | Performed by: PHYSICIAN ASSISTANT

## 2023-03-07 PROCEDURE — 88311 DECALCIFY TISSUE: CPT | Mod: 26,,, | Performed by: PATHOLOGY

## 2023-03-07 PROCEDURE — 88311 PR  DECALCIFY TISSUE: ICD-10-PCS | Mod: 26,,, | Performed by: PATHOLOGY

## 2023-03-07 PROCEDURE — 82962 GLUCOSE BLOOD TEST: CPT | Mod: PO | Performed by: ORTHOPAEDIC SURGERY

## 2023-03-07 PROCEDURE — 01830 ANES ARTHR/NDSC WRST/HND NOS: CPT | Mod: PO | Performed by: ORTHOPAEDIC SURGERY

## 2023-03-07 PROCEDURE — D9220A PRA ANESTHESIA: Mod: ANES,,, | Performed by: ANESTHESIOLOGY

## 2023-03-07 PROCEDURE — 37000009 HC ANESTHESIA EA ADD 15 MINS: Mod: PO | Performed by: ORTHOPAEDIC SURGERY

## 2023-03-07 PROCEDURE — 88305 TISSUE EXAM BY PATHOLOGIST: CPT | Mod: 26,,, | Performed by: PATHOLOGY

## 2023-03-07 PROCEDURE — 88305 TISSUE EXAM BY PATHOLOGIST: ICD-10-PCS | Mod: 26,,, | Performed by: PATHOLOGY

## 2023-03-07 PROCEDURE — 63600175 PHARM REV CODE 636 W HCPCS: Mod: PO | Performed by: ANESTHESIOLOGY

## 2023-03-07 PROCEDURE — 25000003 PHARM REV CODE 250: Mod: PO | Performed by: ANESTHESIOLOGY

## 2023-03-07 PROCEDURE — 36000708 HC OR TIME LEV III 1ST 15 MIN: Mod: PO | Performed by: ORTHOPAEDIC SURGERY

## 2023-03-07 PROCEDURE — C1769 GUIDE WIRE: HCPCS | Mod: PO | Performed by: ORTHOPAEDIC SURGERY

## 2023-03-07 PROCEDURE — 25000003 PHARM REV CODE 250: Mod: PO | Performed by: NURSE ANESTHETIST, CERTIFIED REGISTERED

## 2023-03-07 PROCEDURE — 88311 DECALCIFY TISSUE: CPT | Performed by: PATHOLOGY

## 2023-03-07 PROCEDURE — 36000709 HC OR TIME LEV III EA ADD 15 MIN: Mod: PO | Performed by: ORTHOPAEDIC SURGERY

## 2023-03-07 PROCEDURE — D9220A PRA ANESTHESIA: ICD-10-PCS | Mod: CRNA,,, | Performed by: NURSE ANESTHETIST, CERTIFIED REGISTERED

## 2023-03-07 PROCEDURE — 37000008 HC ANESTHESIA 1ST 15 MINUTES: Mod: PO | Performed by: ORTHOPAEDIC SURGERY

## 2023-03-07 PROCEDURE — 71000033 HC RECOVERY, INTIAL HOUR: Mod: PO | Performed by: ORTHOPAEDIC SURGERY

## 2023-03-07 PROCEDURE — 27200651 HC AIRWAY, LMA: Mod: PO | Performed by: ANESTHESIOLOGY

## 2023-03-07 PROCEDURE — 25215 PR REMOVAL OF PROX ROW CARPAL BONES: ICD-10-PCS | Mod: RT,,, | Performed by: ORTHOPAEDIC SURGERY

## 2023-03-07 PROCEDURE — 63600175 PHARM REV CODE 636 W HCPCS: Mod: PO | Performed by: NURSE ANESTHETIST, CERTIFIED REGISTERED

## 2023-03-07 PROCEDURE — D9220A PRA ANESTHESIA: Mod: CRNA,,, | Performed by: NURSE ANESTHETIST, CERTIFIED REGISTERED

## 2023-03-07 PROCEDURE — 64415 NJX AA&/STRD BRCH PLXS IMG: CPT | Mod: PO | Performed by: ANESTHESIOLOGY

## 2023-03-07 RX ORDER — LIDOCAINE HCL/PF 100 MG/5ML
SYRINGE (ML) INTRAVENOUS
Status: DISCONTINUED | OUTPATIENT
Start: 2023-03-07 | End: 2023-03-07

## 2023-03-07 RX ORDER — MUPIROCIN 20 MG/G
OINTMENT TOPICAL
Status: DISCONTINUED | OUTPATIENT
Start: 2023-03-07 | End: 2023-03-07 | Stop reason: HOSPADM

## 2023-03-07 RX ORDER — OXYCODONE HYDROCHLORIDE 10 MG/1
10 TABLET ORAL EVERY 4 HOURS PRN
Qty: 14 TABLET | Refills: 0 | Status: SHIPPED | OUTPATIENT
Start: 2023-03-07

## 2023-03-07 RX ORDER — PROPOFOL 10 MG/ML
VIAL (ML) INTRAVENOUS
Status: DISCONTINUED | OUTPATIENT
Start: 2023-03-07 | End: 2023-03-07

## 2023-03-07 RX ORDER — EPHEDRINE SULFATE 50 MG/ML
INJECTION, SOLUTION INTRAVENOUS
Status: DISCONTINUED | OUTPATIENT
Start: 2023-03-07 | End: 2023-03-07

## 2023-03-07 RX ORDER — BUPIVACAINE HYDROCHLORIDE 5 MG/ML
INJECTION, SOLUTION EPIDURAL; INTRACAUDAL
Status: COMPLETED | OUTPATIENT
Start: 2023-03-07 | End: 2023-03-07

## 2023-03-07 RX ORDER — ONDANSETRON 2 MG/ML
4 INJECTION INTRAMUSCULAR; INTRAVENOUS ONCE
Status: COMPLETED | OUTPATIENT
Start: 2023-03-07 | End: 2023-03-07

## 2023-03-07 RX ORDER — FENTANYL CITRATE 50 UG/ML
25 INJECTION, SOLUTION INTRAMUSCULAR; INTRAVENOUS EVERY 5 MIN PRN
Status: ACTIVE | OUTPATIENT
Start: 2023-03-07 | End: 2023-03-07

## 2023-03-07 RX ORDER — CEFAZOLIN SODIUM 2 G/50ML
2 SOLUTION INTRAVENOUS
Status: DISCONTINUED | OUTPATIENT
Start: 2023-03-07 | End: 2023-03-07 | Stop reason: HOSPADM

## 2023-03-07 RX ORDER — DEXAMETHASONE SODIUM PHOSPHATE 4 MG/ML
8 INJECTION, SOLUTION INTRA-ARTICULAR; INTRALESIONAL; INTRAMUSCULAR; INTRAVENOUS; SOFT TISSUE
Status: ACTIVE | OUTPATIENT
Start: 2023-03-07 | End: 2023-03-07

## 2023-03-07 RX ORDER — LIDOCAINE HYDROCHLORIDE 10 MG/ML
1 INJECTION, SOLUTION EPIDURAL; INFILTRATION; INTRACAUDAL; PERINEURAL ONCE
Status: ACTIVE | OUTPATIENT
Start: 2023-03-07

## 2023-03-07 RX ORDER — MIDAZOLAM HYDROCHLORIDE 1 MG/ML
0.5 INJECTION INTRAMUSCULAR; INTRAVENOUS
Status: ACTIVE | OUTPATIENT
Start: 2023-03-07

## 2023-03-07 RX ORDER — FENTANYL CITRATE 50 UG/ML
25 INJECTION, SOLUTION INTRAMUSCULAR; INTRAVENOUS EVERY 5 MIN PRN
Status: ACTIVE | OUTPATIENT
Start: 2023-03-07

## 2023-03-07 RX ORDER — ACETAMINOPHEN 10 MG/ML
INJECTION, SOLUTION INTRAVENOUS
Status: DISCONTINUED | OUTPATIENT
Start: 2023-03-07 | End: 2023-03-07

## 2023-03-07 RX ORDER — OXYCODONE HYDROCHLORIDE 5 MG/1
5 TABLET ORAL
Status: ACTIVE | OUTPATIENT
Start: 2023-03-07

## 2023-03-07 RX ORDER — SODIUM CHLORIDE, SODIUM LACTATE, POTASSIUM CHLORIDE, CALCIUM CHLORIDE 600; 310; 30; 20 MG/100ML; MG/100ML; MG/100ML; MG/100ML
INJECTION, SOLUTION INTRAVENOUS CONTINUOUS
Status: DISPENSED | OUTPATIENT
Start: 2023-03-07

## 2023-03-07 RX ORDER — ONDANSETRON 8 MG/1
8 TABLET, ORALLY DISINTEGRATING ORAL EVERY 8 HOURS PRN
Qty: 5 TABLET | Refills: 0 | Status: SHIPPED | OUTPATIENT
Start: 2023-03-07

## 2023-03-07 RX ORDER — ONDANSETRON 4 MG/1
4 TABLET, ORALLY DISINTEGRATING ORAL ONCE
Status: DISCONTINUED | OUTPATIENT
Start: 2023-03-07 | End: 2023-03-07

## 2023-03-07 RX ORDER — HYDROMORPHONE HYDROCHLORIDE 2 MG/ML
0.2 INJECTION, SOLUTION INTRAMUSCULAR; INTRAVENOUS; SUBCUTANEOUS EVERY 5 MIN PRN
Status: ACTIVE | OUTPATIENT
Start: 2023-03-07

## 2023-03-07 RX ORDER — ONDANSETRON 2 MG/ML
INJECTION INTRAMUSCULAR; INTRAVENOUS
Status: DISCONTINUED | OUTPATIENT
Start: 2023-03-07 | End: 2023-03-07

## 2023-03-07 RX ORDER — DEXAMETHASONE SODIUM PHOSPHATE 4 MG/ML
INJECTION, SOLUTION INTRA-ARTICULAR; INTRALESIONAL; INTRAMUSCULAR; INTRAVENOUS; SOFT TISSUE
Status: DISCONTINUED | OUTPATIENT
Start: 2023-03-07 | End: 2023-03-07

## 2023-03-07 RX ORDER — CEFAZOLIN SODIUM 1 G/3ML
INJECTION, POWDER, FOR SOLUTION INTRAMUSCULAR; INTRAVENOUS
Status: DISCONTINUED | OUTPATIENT
Start: 2023-03-07 | End: 2023-03-07

## 2023-03-07 RX ADMIN — BUPIVACAINE 10 ML: 13.3 INJECTION, SUSPENSION, LIPOSOMAL INFILTRATION at 11:03

## 2023-03-07 RX ADMIN — DEXAMETHASONE SODIUM PHOSPHATE 4 MG: 4 INJECTION, SOLUTION INTRAMUSCULAR; INTRAVENOUS at 12:03

## 2023-03-07 RX ADMIN — FENTANYL CITRATE 25 MCG: 50 INJECTION, SOLUTION INTRAMUSCULAR; INTRAVENOUS at 11:03

## 2023-03-07 RX ADMIN — PROPOFOL 200 MG: 10 INJECTION, EMULSION INTRAVENOUS at 12:03

## 2023-03-07 RX ADMIN — ONDANSETRON 4 MG: 2 INJECTION INTRAMUSCULAR; INTRAVENOUS at 02:03

## 2023-03-07 RX ADMIN — MIDAZOLAM 2 MG: 1 INJECTION INTRAMUSCULAR; INTRAVENOUS at 11:03

## 2023-03-07 RX ADMIN — BUPIVACAINE HYDROCHLORIDE 10 ML: 5 INJECTION, SOLUTION EPIDURAL; INTRACAUDAL; PERINEURAL at 11:03

## 2023-03-07 RX ADMIN — ONDANSETRON 4 MG: 2 INJECTION, SOLUTION INTRAMUSCULAR; INTRAVENOUS at 12:03

## 2023-03-07 RX ADMIN — EPHEDRINE SULFATE 10 MG: 50 INJECTION INTRAVENOUS at 12:03

## 2023-03-07 RX ADMIN — CEFAZOLIN 2 G: 330 INJECTION, POWDER, FOR SOLUTION INTRAMUSCULAR; INTRAVENOUS at 12:03

## 2023-03-07 RX ADMIN — SODIUM CHLORIDE, POTASSIUM CHLORIDE, SODIUM LACTATE AND CALCIUM CHLORIDE: 600; 310; 30; 20 INJECTION, SOLUTION INTRAVENOUS at 01:03

## 2023-03-07 RX ADMIN — MUPIROCIN: 20 OINTMENT TOPICAL at 10:03

## 2023-03-07 RX ADMIN — ACETAMINOPHEN 1000 MG: 10 INJECTION, SOLUTION INTRAVENOUS at 12:03

## 2023-03-07 RX ADMIN — LIDOCAINE HYDROCHLORIDE 100 MG: 20 INJECTION, SOLUTION INTRAVENOUS at 12:03

## 2023-03-07 RX ADMIN — SODIUM CHLORIDE, POTASSIUM CHLORIDE, SODIUM LACTATE AND CALCIUM CHLORIDE: 600; 310; 30; 20 INJECTION, SOLUTION INTRAVENOUS at 10:03

## 2023-03-07 NOTE — DISCHARGE SUMMARY
[facility]  Discharge Note  Short Stay    Procedure(s) (LRB):  Right wrist proximal row carpectomy (Right)      OUTCOME: Patient tolerated treatment/procedure well without complication and is now ready for discharge.    DISPOSITION: Home or Self Care    FINAL DIAGNOSIS:  Slac (scapholunate advanced collapse) of wrist, right    FOLLOWUP: In clinic    DISCHARGE INSTRUCTIONS:    Discharge Procedure Orders   SLING ORTHOPEDIC LARGE FOR HOME USE     Diet general     Activity as tolerated     Keep surgical extremity elevated     Lifting restrictions   Order Comments: Please do not lift or push off with the right arm or hand     Leave dressing on - Keep it clean, dry, and intact until clinic visit     Call MD for:  temperature >100.4     Call MD for:  persistent nausea and vomiting     Call MD for:  severe uncontrolled pain     Call MD for:  difficulty breathing, headache or visual disturbances     Call MD for:  redness, tenderness, or signs of infection (pain, swelling, redness, odor or green/yellow discharge around incision site)     Call MD for:  hives     Call MD for:  persistent dizziness or light-headedness     Call MD for:  extreme fatigue        TIME SPENT ON DISCHARGE: 15 minutes

## 2023-03-07 NOTE — OP NOTE
Mari Peters  1965    DATE OF SURGERY: 3/7/2023     PRE-OPERATIVE DIAGNOSIS:  Right wrist arthritis/scapholunate advanced collapse    POST-OPERATIVE DIAGNOSIS:  Right wrist arthritis/scapholunate advanced collapse     ANESTHESIA TYPE:  General with a single-shot supraclavicular block    BLOOD LOSS:  15-20 cc    TOURNIQUET TIME:  59 minutes    SURGEON: Dr Hernandez    ASSISTANT: Albina Jiménez    PROCEDURE:  Right wrist proximal row carpectomy    IMPLANTS:  None     SPECIMENS:  Right wrist carpal bones for path    INDICATION:     Mr. Peters had a history of a scapholunate ligament injury.  He had an attempt at a reconstruction.  The reconstruction was unsuccessful and therefore he had widening of the scapholunate interval with severe degenerative changes consistent with scapholunate advanced collapse.  That point I discussed the risks and benefits of proximal row carpectomy versus wrist arthrodesis.  Patient stated that he would like to proceed with the proximal row carpectomy and after discussion of the risks and benefits of the procedure informed consent was obtained    PROCEDURE IN DETAIL:     Mr. Peters was transported to the operating room and was placed supine on the operating room table. All appropriate points were padded. The right arm and hand was prepped and draped in the normal sterile fashion. Time out was called. The correct patient, correct operative site, correct procedure, antibiotic administration which consisted of 2 g of Ancef, and allergies to medications which are to Bee sting [allergen ext-venom-honey bee]  were reviewed. Time in was then called.     Attention was turned to the right wrist.  A 7-9 cm incision was made directly over the dorsum of the wrist.  This was made at the site of the previous incision.  The incision was carried through the skin.  Subcutaneous tissues were dissected with tenotomy scissors.  There is relatively large amount of scar tissue noted which was dissected  with tenotomy scissors.  The tendons of the 1st 2nd and 3rd extensor compartments were identified and retracted out of the way.  The extensor retinaculum was divided longitudinally.  The wrist capsule was identified.  An incision was made through the wrist capsule with a distally based capsular flap.  The carpus was then identified.  There was a large amount of scar tissue with widening of the scapholunate interval.  There was multiple fragments of metallic debris throughout the dorsum of the wrist.  These were removed this much as possible.  Scaphoid was identified.  There was an anchor within the scaphoid which was degraded.  The suture had ruptured through the repair site.  A pin was then placed into the scaphoid to use as a joystick and McGlamry retractor was used to free the scaphoid from all sides.  The scaphoid was then excised without difficulty.  Attention was then turned to the lunate.  There was also a large amount damage to the lunate and surrounding soft tissues.  There was no remaining cartilage on the lunate but the lunate fossa still had reasonable cartilage cover.  A pin was then placed into the lunate and a McGlamry retractor was used to remove the lunate.  Attention was turned to the triquetrum.  The triquetrum was the least damaged of all 3 proximal bones.  A guide pin was then placed into the triquetrum and combination of scalpel and McGlamry retractor was used to resect the bone as well.  With the proximal row carpectomy completed there was a large amount of scar tissue of the surrounding capsule and intra-articularly.  All of this was removed sharply with a scalpel.  The wound was then copiously irrigated.  Fluoroscopy was used to assess the positioning of the capitate and distal row.  This was noted to sit well within the lunate fossa.  There was impingement of the radial styloid on the trapezium and therefore I proceeded with a radial styloidectomy.  Soft tissue around the radial styloid tip  was dissected.  An osteotome was used and a portion of the radial styloid was resected.  Fluoroscopy was again used.  This was noted to improve the space for the trapezium and there was no further impingement.  The wound was again was copiously irrigated.  The tourniquet was let down and hemostasis was obtained.  The capsule and dorsal ligaments were repaired with 3-0 Ethibond suture.  This further stabilized the wrist.  The wrist was then taken through a range of motion under fluoroscopy was noted to stay well seated within the lunate fossa and tracked freely within the arc of motion that was very good.  Attention was then turned to the extensor retinaculum which was repaired with 2-0 Vicryl suture.  The skin was then closed with combination of 3-0 Vicryl subcutaneous sutures and 3-0 nylon superficial sutures.  The wound was dressed with Xeroform, gauze padding, cast padding and a short-arm volar splint was placed.    The patient was awakened from anesthesia and was transported to the recovery room in stable condition. All lap, needle, sponge, and equipment counts were correct at the end of the case.    POST-OPERATIVE PLAN:     Patient will keep the splint in place for 2 weeks which time he will follow up with me.  Will be placed into a short-arm cast for 5-6 weeks prior to beginning range of motion of the right wrist

## 2023-03-07 NOTE — ANESTHESIA PROCEDURE NOTES
Intubation    Date/Time: 3/7/2023 12:08 PM  Performed by: Gabriela Lucas CRNA  Authorized by: Laura Castro MD     Intubation:     Induction:  Intravenous    Intubated:  Postinduction    Mask Ventilation:  N/a    Attempts:  1    Attempted By:  CRNA    Difficult Airway Encountered?: No      Complications:  None    Airway Device:  Supraglottic airway/LMA    Airway Device Size:  4.0    Style/Cuff Inflation:  Cuffed (inflated to minimal occlusive pressure)    Placement Verified By:  Capnometry    Findings Post-Intubation:  Atraumatic/condition of teeth unchanged

## 2023-03-07 NOTE — TRANSFER OF CARE
"Anesthesia Transfer of Care Note    Patient: Mari Peters    Procedure(s) Performed: Procedure(s) (LRB):  Right wrist proximal row carpectomy (Right)    Patient location: PACU    Anesthesia Type: general    Transport from OR: Transported from OR on 2-3 L/min O2 by NC with adequate spontaneous ventilation    Post pain: adequate analgesia    Post assessment: no apparent anesthetic complications and tolerated procedure well    Post vital signs: stable    Level of consciousness: sedated    Nausea/Vomiting: no nausea/vomiting    Complications: none    Transfer of care protocol was followed      Last vitals:   Visit Vitals  /86 (BP Location: Left arm, Patient Position: Lying)   Pulse 70   Temp 36.8 °C (98.2 °F) (Skin)   Resp 15   Ht 5' 8" (1.727 m)   Wt 88 kg (194 lb)   SpO2 96%   BMI 29.50 kg/m²     "

## 2023-03-07 NOTE — H&P
3/7/2023    Chief Complaint:  No chief complaint on file.      HPI:  Mari Peters is a 57 y.o. male, who presents to the surgery center today.  Has a history of a failed scapholunate ligament reconstruction.  He subsequently developed a scapholunate advanced collapse with severe arthritis in the wrist.  He has had severe pain and he is here today to undergo a right wrist proximal row carpectomy.  He has no new complaints.    PMHX:  Past Medical History:   Diagnosis Date    Anticoagulant long-term use     eliquis    Colon polyps 2012    suspicious,  pending further w/u     Depression     Diabetes mellitus     Diabetes mellitus, type 2 2012    History of pulmonary embolism 01/10/2014    HTN (hypertension) 02/07/2014    Hyperlipidemia     Hypothyroidism     Injury of right wrist 03/2019    Thromboembolism 01/2014    bilateral LE blood clots, also diffuse pulmonary blood clots    Thyroid disease        PSHX:  Past Surgical History:   Procedure Laterality Date    ANKLE SURGERY Right     RECONSTRUCTION OF LIGAMENT Right 3/25/2019    Procedure: RIGHT SCAPHOLUNATE INTEROSSEOUS LIGAMENT RECONSTRUCTION with FLEXOR CARPI RADIALIS;  Surgeon: LALA Corbin MD;  Location: [facility];  Service: Orthopedics;  Laterality: Right;    TENDON TRANSFER Right 3/25/2019    Procedure: TRANSFER, TENDON/ FCR;  Surgeon: LALA Corbin MD;  Location: [facility];  Service: Orthopedics;  Laterality: Right;       FMHX:  Family History   Problem Relation Age of Onset    Diabetes Mother     Heart disease Mother     COPD Mother        SOCHX:  Social History     Tobacco Use    Smoking status: Never    Smokeless tobacco: Current     Types: Chew    Tobacco comments:     2 pk per day of chew   Substance Use Topics    Alcohol use: Yes     Comment: 2-3 times per yr       ALLERGIES:  Bee sting [allergen ext-venom-honey bee]    CURRENT MEDICATIONS:  No current facility-administered medications on file prior to encounter.     Current Outpatient  Medications on File Prior to Encounter   Medication Sig Dispense Refill    dabigatran etexilate (PRADAXA) 150 mg Cap Take 150 mg by mouth.      ergocalciferol (ERGOCALCIFEROL) 50,000 unit Cap Take 50,000 Units by mouth every 7 days.      escitalopram oxalate (LEXAPRO) 10 MG tablet Take 1 tablet (10 mg total) by mouth once daily. 30 tablet 6    gabapentin (NEURONTIN) 300 MG capsule Take 300 mg by mouth 3 (three) times daily.      insulin (BASAGLAR KWIKPEN U-100 INSULIN) glargine 100 units/mL (3mL) SubQ pen Inject 30 Units into the skin once daily. (Patient taking differently: Inject 40 Units into the skin every evening.) 45 mL 4    insulin aspart U-100 (NOVOLOG FLEXPEN U-100 INSULIN) 100 unit/mL (3 mL) InPn pen Take 10 units with meals TID and correctional insulin 2 units for every 50 mg/DL over 150. TDD not to exceed 50. 30 mL 5    levothyroxine (SYNTHROID) 100 MCG tablet Take 100 mcg by mouth once daily. TAKE AM OF SURGERY WITH A SIP OF WATER      lisinopril (PRINIVIL,ZESTRIL) 5 MG tablet Take 2 tablets (10 mg total) by mouth once daily. 30 tablet 11    semaglutide (OZEMPIC) 0.25 mg or 0.5 mg(2 mg/1.5 mL) pen injector Inject into the skin every 7 days.      traZODone (DESYREL) 50 MG tablet Take 50 mg by mouth nightly as needed for Insomnia.      alogliptin (NESINA) 25 mg Tab Take 25 mg by mouth once daily. 90 tablet 3    apixaban (ELIQUIS) 5 mg Tab Take 5 mg by mouth 2 (two) times daily. Stop per cardiologist instructions      atorvastatin (LIPITOR) 10 MG tablet Take 1 tablet (10 mg total) by mouth once daily. 30 tablet 6    blood sugar diagnostic Strp Test strips and lancets of pt choice 100 strip 12    dapagliflozin (FARXIGA) 5 mg Tab tablet Take 1 tablet (5 mg total) by mouth once daily. 90 tablet 3    dextroamphetamine-amphetamine (ADDERALL XR) 20 MG 24 hr capsule Take 20 mg by mouth daily as needed. HOLD AM OF SURGERY      lancets (ACCU-CHEK SOFTCLIX LANCETS) Misc 1 Bottle by Misc.(Non-Drug; Combo Route) route  "3 (three) times daily as needed. 1 each 3    pen needle, diabetic (BD ULTRA-FINE FIGUEROA PEN NEEDLE) 32 gauge x 5/32" Ndle Use with insulin 4 x daily. 400 each 4       REVIEW OF SYSTEMS:  ROS    GENERAL PHYSICAL EXAM:   /86 (BP Location: Left arm, Patient Position: Lying)   Pulse 70   Temp 98.2 °F (36.8 °C) (Skin)   Resp 15   Ht 5' 8" (1.727 m)   Wt 88 kg (194 lb)   SpO2 96%   BMI 29.50 kg/m²    GEN: well developed, well nourished, no acute distress   HENT: Normocephalic, atraumatic   EYES: No discharge, conjunctiva normal   NECK: Supple, non-tender   PULM: No wheezing, no respiratory distress   CV: RRR   ABD: Soft, non-tender    ORTHO EXAM:   Examination the right wrist and hand reveals that he is a well-healed incision over the dorsum of the hand.  There is no significant edema.  He does have tenderness to palpation over the radial carpal joint.  Wrist range of motion is limited with extension of 60° and flexion of 50°.  He does have a 2+ radial pulse and sensation is grossly intact in the median radial ulnar distribution    RADIOLOGY:   X-rays of the right wrist were reviewed.  He is noted have severe degenerative duration about the wrist.  There is evidence of scapholunate advanced collapse.  The lunate fossa appears to be well maintained as does the capitate    ASSESSMENT:   Right wrist scapholunate advanced collapse/arthritis    PLAN:  1. I have discussed treatment with the patient.  I did review the risks and benefits of right wrist proximal row carpectomy after review of the risks and benefits the patient has confirmed informed consent     2.  Will proceed with right wrist proximal row carpectomy under general anesthesia    3.  Will follow up with me 2 weeks postoperatively  "

## 2023-03-07 NOTE — ANESTHESIA PREPROCEDURE EVALUATION
03/07/2023  Mari Peters is a 57 y.o., male.    Pre-op Assessment    I have reviewed the Patient Summary Reports.    I have reviewed the Nursing Notes.    I have reviewed the Medications.     Review of Systems  Anesthesia Hx:  No problems with previous Anesthesia    Social:  Non-Smoker    Hematology/Oncology:        Hematology Comments: Long-term apixaban use, history of PE/DVT   Cardiovascular:   Hypertension hyperlipidemia    Hepatic/GI:   Fatty liver   Endocrine:   Diabetes, poorly controlled, type 2, using insulin Hypothyroidism Recent changes on diabetic care per PMD. Patient is taking insulin and his HGBA1c is improving.   Psych:   depression ADD         Physical Exam  General:  Well nourished      Airway/Jaw/Neck:  Airway Findings: Mouth Opening: Normal   Tongue: Normal   General Airway Assessment: Adult Mallampati: II  TM Distance: Normal, at least 6 cm   Jaw/Neck Findings:  Neck ROM: Normal ROM      Eyes/Ears/Nose:  Eyes/Ears/Nose Findings:    Dental:  Dental Findings: In tact     Chest/Lungs:  Chest/Lungs Findings: Normal Respiratory Rate      Heart/Vascular:  Heart Findings: Rate: Normal  Rhythm: Regular Rhythm  Sounds: Normal  Heart murmur: negative       Mental Status:  Mental Status Findings:  Cooperative         Anesthesia Plan  Type of Anesthesia, risks & benefits discussed:  Anesthesia Type:  Gen Supraglottic Airway    Patient's Preference:   Plan Factors:          Intra-op Monitoring Plan: Standard ASA Monitors  Intra-op Monitoring Plan Comments:   Post Op Pain Control Plan: peripheral nerve block, multimodal analgesia and IV/PO Opioids PRN  Post Op Pain Control Plan Comments:     Induction:   IV  Beta Blocker:  Patient is not currently on a Beta-Blocker (No further documentation required).       Informed Consent: Informed consent signed with the Patient and all parties understand the  risks and agree with anesthesia plan.  All questions answered.  Anesthesia consent signed with patient.  ASA Score: 3     Day of Surgery Review of History & Physical:    H&P Update referred to the surgeon/provider.          Ready For Surgery From Anesthesia Perspective.           Physical Exam  General: Well nourished    Airway:  Mallampati: II   Mouth Opening: Normal  TM Distance: Normal, at least 6 cm  Tongue: Normal  Neck ROM: Normal ROM    Dental:  In tact    Chest/Lungs:  Normal Respiratory Rate    Heart:  Rate: Normal  Rhythm: Regular Rhythm  Sounds: Normal          Anesthesia Plan  Type of Anesthesia, risks & benefits discussed:    Anesthesia Type: Gen Supraglottic Airway  Intra-op Monitoring Plan: Standard ASA Monitors  Post Op Pain Control Plan: peripheral nerve block, multimodal analgesia and IV/PO Opioids PRN  Induction:  IV  Informed Consent: Informed consent signed with the Patient and all parties understand the risks and agree with anesthesia plan.  All questions answered.   ASA Score: 3  Day of Surgery Review of History & Physical: H&P Update referred to the surgeon/provider.    Ready For Surgery From Anesthesia Perspective.       .

## 2023-03-07 NOTE — PATIENT INSTRUCTIONS
Procedure:  Right wrist proximal row carpectomy    1.  Please keep the splint clean, dry, and in place. Do not take it off and do not get it wet.    2.  Please keep the sling to the right arm in place until you have regained full control over the arm and hand    3.  The pain block to the right arm will last between 1 and 3 days.  As soon as the pain block begins to wear off you can begin taking the prescribed pain medication    4.  Nausea medication has been prescribed in the case that you have any postoperative stomach upset    5.  Flexion and extension of the exposed fingers is encouraged but do NOT attempt to lift or push off with the right arm or hand.    6.  If there are any questions or concerns please call Dr. Hernandez office at [phone]    7.  Follow up with Dr. Hernandez in 2 weeks

## 2023-03-07 NOTE — OR NURSING
Right supraclavicular single shot block complete per Dr. Castro with Anesthesia.  Pt tolerated well. Exparel band applied to pt. See Anesthesia record for procedural notes. See flowsheet and MAR for vitals and medications. Monitors in place, alarms audible. VSS. etCO2 monitoring in place. Resp even, unlabored. Skin warm, dry. Pt in NAD. Pt awaiting transport to OR. Family at bedside. Bed in lowest, locked position. Side rails up x2. Call light within reach.

## 2023-03-07 NOTE — ANESTHESIA PROCEDURE NOTES
Peripheral Block    Patient location during procedure: pre-op   Block not for primary anesthetic.  Reason for block: at surgeon's request and post-op pain management   Post-op Pain Location: right wrist   Start time: 3/7/2023 11:18 AM  Timeout: 3/7/2023 11:18 AM   End time: 3/7/2023 11:21 AM    Staffing  Authorizing Provider: Laura Castro MD  Performing Provider: Laura Castro MD    Preanesthetic Checklist  Completed: patient identified, IV checked, site marked, risks and benefits discussed, surgical consent, monitors and equipment checked, pre-op evaluation and timeout performed  Peripheral Block  Patient position: supine  Prep: ChloraPrep  Patient monitoring: heart rate, cardiac monitor, continuous pulse ox, continuous capnometry and frequent blood pressure checks  Block type: supraclavicular  Laterality: right  Injection technique: single shot  Needle  Needle type: Stimuplex   Needle gauge: 22 G  Needle length: 2 in  Needle localization: anatomical landmarks and ultrasound guidance   -ultrasound image captured on disc.  Assessment  Injection assessment: negative aspiration, negative parasthesia and local visualized surrounding nerve  Paresthesia pain: none  Heart rate change: no  Slow fractionated injection: yes  Pain Tolerance: comfortable throughout block and no complaints  Medications:    Medications: bupivacaine (pf) (MARCAINE) injection 0.5% - Perineural   10 mL - 3/7/2023 11:21:00 AM    Additional Notes  VSS.  [facility] RN monitoring vitals throughout procedure.  Patient tolerated procedure well.     Exparel 10mL

## 2023-03-08 VITALS
SYSTOLIC BLOOD PRESSURE: 130 MMHG | TEMPERATURE: 99 F | RESPIRATION RATE: 16 BRPM | OXYGEN SATURATION: 97 % | HEIGHT: 68 IN | HEART RATE: 81 BPM | DIASTOLIC BLOOD PRESSURE: 82 MMHG | WEIGHT: 194 LBS | BODY MASS INDEX: 29.4 KG/M2

## 2023-03-08 NOTE — ANESTHESIA POSTPROCEDURE EVALUATION
Anesthesia Post Evaluation    Patient: Mari Peters    Procedure(s) Performed: Procedure(s) (LRB):  Right wrist proximal row carpectomy (Right)    Final Anesthesia Type: general      Patient location during evaluation: PACU  Patient participation: Yes- Able to Participate  Level of consciousness: awake and alert  Post-procedure vital signs: reviewed and stable  Pain management: adequate  Airway patency: patent    PONV status at discharge: No PONV  Anesthetic complications: no      Cardiovascular status: blood pressure returned to baseline  Respiratory status: unassisted and room air  Hydration status: euvolemic  Follow-up not needed.          Vitals Value Taken Time   /82 03/07/23 1435   Temp 36.9 °C (98.5 °F) 03/07/23 1435   Pulse 81 03/07/23 1435   Resp 16 03/07/23 1435   SpO2 97 % 03/07/23 1435         Event Time   Out of Recovery 14:10:00         Pain/Rangel Score: Pain Rating Prior to Med Admin: 6 (3/7/2023 11:15 AM)  Pain Rating Post Med Admin: 0 (3/7/2023 11:41 AM)  Rangel Score: 10 (3/7/2023  2:20 PM)

## 2023-03-14 DIAGNOSIS — M19.131 SLAC (SCAPHOLUNATE ADVANCED COLLAPSE) OF WRIST, RIGHT: Primary | ICD-10-CM

## 2023-03-15 LAB
FINAL PATHOLOGIC DIAGNOSIS: NORMAL
Lab: NORMAL

## 2023-03-22 ENCOUNTER — OFFICE VISIT (OUTPATIENT)
Dept: ORTHOPEDICS | Facility: CLINIC | Age: 58
End: 2023-03-22
Payer: OTHER GOVERNMENT

## 2023-03-22 ENCOUNTER — HOSPITAL ENCOUNTER (OUTPATIENT)
Dept: RADIOLOGY | Facility: HOSPITAL | Age: 58
Discharge: HOME | End: 2023-03-22
Attending: ORTHOPAEDIC SURGERY
Payer: OTHER GOVERNMENT

## 2023-03-22 DIAGNOSIS — M19.131 SLAC (SCAPHOLUNATE ADVANCED COLLAPSE) OF WRIST, RIGHT: ICD-10-CM

## 2023-03-22 DIAGNOSIS — M19.131 SLAC (SCAPHOLUNATE ADVANCED COLLAPSE) OF WRIST, RIGHT: Primary | ICD-10-CM

## 2023-03-22 PROCEDURE — 29075 APPL CST ELBW FNGR SHORT ARM: CPT | Mod: PBBFAC,PN,RT | Performed by: ORTHOPAEDIC SURGERY

## 2023-03-22 PROCEDURE — 73110 XR WRIST COMPLETE 3 VIEWS RIGHT: ICD-10-PCS | Mod: 26,RT,, | Performed by: RADIOLOGY

## 2023-03-22 PROCEDURE — 73110 X-RAY EXAM OF WRIST: CPT | Mod: TC,PO,RT

## 2023-03-22 PROCEDURE — 29075 APPL CST ELBW FNGR SHORT ARM: CPT | Mod: S$PBB,58,RT, | Performed by: ORTHOPAEDIC SURGERY

## 2023-03-22 PROCEDURE — 73110 X-RAY EXAM OF WRIST: CPT | Mod: 26,RT,, | Performed by: RADIOLOGY

## 2023-03-22 PROCEDURE — 99999 PR PBB SHADOW E&M-EST. PATIENT-LVL III: CPT | Mod: PBBFAC,,, | Performed by: ORTHOPAEDIC SURGERY

## 2023-03-22 PROCEDURE — 99024 POSTOP FOLLOW-UP VISIT: CPT | Mod: ,,, | Performed by: ORTHOPAEDIC SURGERY

## 2023-03-22 PROCEDURE — 29075 PR APPLY FOREARM CAST: ICD-10-PCS | Mod: S$PBB,58,RT, | Performed by: ORTHOPAEDIC SURGERY

## 2023-03-22 PROCEDURE — 99213 OFFICE O/P EST LOW 20 MIN: CPT | Mod: PBBFAC,PN | Performed by: ORTHOPAEDIC SURGERY

## 2023-03-22 PROCEDURE — 99024 PR POST-OP FOLLOW-UP VISIT: ICD-10-PCS | Mod: ,,, | Performed by: ORTHOPAEDIC SURGERY

## 2023-03-22 PROCEDURE — 99999 PR PBB SHADOW E&M-EST. PATIENT-LVL III: ICD-10-PCS | Mod: PBBFAC,,, | Performed by: ORTHOPAEDIC SURGERY

## 2023-03-22 NOTE — PROGRESS NOTES
Mr. Peters returns to clinic today.  He is 2 weeks status post right wrist proximal row carpectomy.  He is overall doing well.  States that his pain is relatively well controlled.      Physical exam:  Examination of the right wrist reveals that the incision is healing well.  There is only mild edema.  There is no erythema or drainage.  He is neurovascularly intact distally.      Radiology:  X-rays of the right wrist were taken in clinic today.  He is noted have evidence of the proximal row carpectomy.      Assessment: Right wrist proximal row carpectomy    Plan:    1. Sutures were removed today and Steri-Strips are placed    2.  Was placed into a short-arm cast    3. Will continue very limited weight-bearing    4. Will follow up in 3 weeks with repeat x-rays of the right wrist out of the cast which point will go to a Velcro brace and begin therapy.

## 2023-04-06 DIAGNOSIS — M19.131 SLAC (SCAPHOLUNATE ADVANCED COLLAPSE) OF WRIST, RIGHT: Primary | ICD-10-CM

## 2023-04-12 ENCOUNTER — OFFICE VISIT (OUTPATIENT)
Dept: ORTHOPEDICS | Facility: CLINIC | Age: 58
End: 2023-04-12
Payer: OTHER GOVERNMENT

## 2023-04-12 ENCOUNTER — HOSPITAL ENCOUNTER (OUTPATIENT)
Dept: RADIOLOGY | Facility: HOSPITAL | Age: 58
Discharge: HOME | End: 2023-04-12
Attending: ORTHOPAEDIC SURGERY
Payer: OTHER GOVERNMENT

## 2023-04-12 DIAGNOSIS — M19.131 SLAC (SCAPHOLUNATE ADVANCED COLLAPSE) OF WRIST, RIGHT: ICD-10-CM

## 2023-04-12 DIAGNOSIS — M19.131 SCAPHOLUNATE ADVANCED COLLAPSE OF RIGHT WRIST: Primary | ICD-10-CM

## 2023-04-12 PROCEDURE — 99024 POSTOP FOLLOW-UP VISIT: CPT | Mod: ,,, | Performed by: ORTHOPAEDIC SURGERY

## 2023-04-12 PROCEDURE — 99024 PR POST-OP FOLLOW-UP VISIT: ICD-10-PCS | Mod: ,,, | Performed by: ORTHOPAEDIC SURGERY

## 2023-04-12 PROCEDURE — 99999 PR PBB SHADOW E&M-EST. PATIENT-LVL III: CPT | Mod: PBBFAC,,, | Performed by: ORTHOPAEDIC SURGERY

## 2023-04-12 PROCEDURE — 99213 OFFICE O/P EST LOW 20 MIN: CPT | Mod: PBBFAC,PN | Performed by: ORTHOPAEDIC SURGERY

## 2023-04-12 PROCEDURE — 99999 PR PBB SHADOW E&M-EST. PATIENT-LVL III: ICD-10-PCS | Mod: PBBFAC,,, | Performed by: ORTHOPAEDIC SURGERY

## 2023-04-12 PROCEDURE — 73110 XR WRIST COMPLETE 3 VIEWS RIGHT: ICD-10-PCS | Mod: 26,RT,, | Performed by: RADIOLOGY

## 2023-04-12 PROCEDURE — 73110 X-RAY EXAM OF WRIST: CPT | Mod: TC,PO,RT

## 2023-04-12 PROCEDURE — 73110 X-RAY EXAM OF WRIST: CPT | Mod: 26,RT,, | Performed by: RADIOLOGY

## 2023-04-12 NOTE — PROGRESS NOTES
Mr. Peters returns to clinic today.  Has a history of right wrist proximal row carpectomy.  He is doing well.  He is approximately 6 weeks post surgery.  He has no new complaints     Physical exam:  Examination of the right wrist reveals that the incision is well healed.  There is minimal to no edema.  There is no erythema or drainage.  He is neurovascularly intact distally.      Radiology:  X-rays of the right wrist were taken in clinic today.  There is evidence of the proximal row carpectomy.  The wrist remains well aligned     Assessment:  Status post right wrist proximal row carpectomy     Plan:    1.  Will place him into a Velcro wrist splint    2.  Will set him up with occupational therapy to begin range of motion and eventually gentle strengthening    3. Will follow up in 3-4 weeks for repeat evaluation with an x-ray of the right wrist

## 2023-04-25 DIAGNOSIS — M19.131 SCAPHOLUNATE ADVANCED COLLAPSE OF RIGHT WRIST: Primary | ICD-10-CM

## 2023-05-03 ENCOUNTER — HOSPITAL ENCOUNTER (OUTPATIENT)
Dept: RADIOLOGY | Facility: HOSPITAL | Age: 58
Discharge: HOME | End: 2023-05-03
Attending: PHYSICIAN ASSISTANT
Payer: OTHER GOVERNMENT

## 2023-05-03 ENCOUNTER — OFFICE VISIT (OUTPATIENT)
Dept: ORTHOPEDICS | Facility: CLINIC | Age: 58
End: 2023-05-03
Payer: OTHER GOVERNMENT

## 2023-05-03 DIAGNOSIS — M19.131 SCAPHOLUNATE ADVANCED COLLAPSE OF RIGHT WRIST: ICD-10-CM

## 2023-05-03 DIAGNOSIS — Z98.890 STATUS POST SURGERY: Primary | ICD-10-CM

## 2023-05-03 PROCEDURE — 73110 X-RAY EXAM OF WRIST: CPT | Mod: TC,PO,RT

## 2023-05-03 PROCEDURE — 99024 POSTOP FOLLOW-UP VISIT: CPT | Mod: ,,, | Performed by: PHYSICIAN ASSISTANT

## 2023-05-03 PROCEDURE — 73110 XR WRIST COMPLETE 3 VIEWS RIGHT: ICD-10-PCS | Mod: 26,RT,, | Performed by: RADIOLOGY

## 2023-05-03 PROCEDURE — 99999 PR PBB SHADOW E&M-EST. PATIENT-LVL IV: CPT | Mod: PBBFAC,,, | Performed by: PHYSICIAN ASSISTANT

## 2023-05-03 PROCEDURE — 99214 OFFICE O/P EST MOD 30 MIN: CPT | Mod: PBBFAC,PN | Performed by: PHYSICIAN ASSISTANT

## 2023-05-03 PROCEDURE — 99999 PR PBB SHADOW E&M-EST. PATIENT-LVL IV: ICD-10-PCS | Mod: PBBFAC,,, | Performed by: PHYSICIAN ASSISTANT

## 2023-05-03 PROCEDURE — 99024 PR POST-OP FOLLOW-UP VISIT: ICD-10-PCS | Mod: ,,, | Performed by: PHYSICIAN ASSISTANT

## 2023-05-03 PROCEDURE — 73110 X-RAY EXAM OF WRIST: CPT | Mod: 26,RT,, | Performed by: RADIOLOGY

## 2023-05-03 NOTE — PROGRESS NOTES
Mari Peters is a 57 y.o. male who presents to clinic for follow-up status post right wrist proximal row carpectomy he was approximally 8 weeks status post surgery.  States he is not performed occupational therapy yet as instructed.  States they never called him.  States pain at rest is 0/10.  States pain with motion/activity is 8/10.  States overall he is doing very well.  States he is not been wearing the velcro splint as instructed, and has been frequently taking it off.  Denies any other complaints at this time.    Physical Exam:  Examination of the right wrist reveals a well-healed surgical site.  No edema, erythema, ecchymosis, or skin breakdown.  Able to flex extend the wrist appropriately.  Moderately reduced range of motion regards to flexion/extension of the right wrist.  Strength not tested secondary to surgery.  Normal sensation in the radial, ulnar, median nerve distributions.  Capillary refill less than 2 seconds in all fingers.    Radiology:  X-rays of the right wrist were taken today in clinic.  X-rays reviewed by myself.  Imaging showed the presence of a proximal row carpectomy of the right wrist.  The capitate remains in a stable position.  No change in position or alignment of the capitate.  No other significant bony abnormalities noted.    Assessment:  Status post right wrist proximal row carpectomy    Plan:  1. I discussed with Mari Peters that the best course of action at this time is to perform a course of occupational therapy to increase the function and range of motion of the right wrist/hand.  We did discuss in great detail the importance of wearing the removable Velcro long wrist splint appropriately, and that failure to do so may result in a negative outcome of the right wrist/hand.  We discussed importance of wearing his removable Velcro long wrist splint at all times only to remove for bathing, occupational therapy, and gentle range-of-motion exercises demonstrated in clinic.  He  verbally agreed with the treatment plan.    2. He was referred occupational therapy in clinic today.      3.  I would like him follow-up in clinic in 4 weeks for repeat evaluation.  He was instructed to contact the clinic for any problems or concerns in the interim.

## 2023-05-04 NOTE — PROGRESS NOTES
Initial OT evaluation completed this date. Please see initial POC in treatment tab.     [facility] Occupational Therapy  Initial Evaluation     Date: 5/9/2023  Patient: Mari Peters  Chart Number: [id number]    Therapy Diagnosis: R wrist pain, decreased ROM R wrist, decreased /pinch/ADL  Physician: Sin Haddad PA-C    Physician Orders: Patient is approximately 8 weeks status post right wrist proximal row carpectomy.  Patient requires occupational therapy to increase the function and range of motion of the right wrist/hand.     Duration:  6 weeks  Frequency:  2-3 times per week     Please initiate proximal row carpectomy therapy protocol.  Thanks!  Medical Diagnosis: Z98.890 (ICD-10-CM) - Status post surgery  Evaluation Date: 5/9/2023  Insurance Authorization period Expiration: 5/2/2024  Plan of Care Expiration Period: 8/7/2023/23    Visit # / Visits Authortized: 1 / 1  Time In:1002  Time Out: 1100  Total Billable Time: 20 minutes    Surgical Procedure:   R proximal row carpectomy    Precautions: Standard and Diabetes and Weightbearing, post op PRC precautions     Date of Surgery: 3/7/2023    S/P: 9 weeks on 5/9/23    Subjective     Involved Side: Right   Dominant Side: Right   Date of Onset: late Dec 2018  Mechanism of Injury/ History of Current Condition: Pt with a history of R S-L repair in 2019 after falling down a flight of stairs.  Sought medical attention a few years later due to continued pain in the R wrist and was diagnosed with SLAC. Pt received injections to manage symptoms and decision to proceed with surgery was made. Pt has been casted x 3 weeks. Then transitioned to prefab wrist brace     Imaging: X rays: There is a grossly stable appearance to the carpus in this patient status post resection of the 1st carpal row.  Some osteophyte formation and joint space narrowing is seen at the 1st metacarpophalangeal joint and there is some osteophyte formation about the distal  radioulnar joint.  Vascular calcifications are noted.     MRI: 2019There is mixed intra-articular extra-articular injection of contrast.  There is a small volume of contrast radiocarpal row level.  There is contrast also demonstrated of the dorsal aspect of the wrist along the radial aspect of the wrist.  Possibility joint capsule tear along the radial aspect of the radiocarpal articulation cannot be excluded.     There is widening of the scapholunate interval measuring 4.5 mm at the closest interval.  There is a full-thickness tear of the scapholunate ligament identified.  The lunotriquetral ligament appears intact.  There is partial-thickness tearing identified of the triangular fibrocartilage along the radial attachment.  There is negative ulnar variance.     There is mild sub chondral marrow edema along the proximal aspect of the lunate and within the distal radius along the ulnar most peripheral aspect of the articular surface.  There is chondral thinning within the radiocarpal articulation.  Additional subtle degenerative subcortical marrow edema identified within additional bones of the carpus.  There is sub cortical cystic change identified within the proximal capitate.  There is no fracture or dislocation identified.  No infiltrative marrow process is identified.     The carpal tunnel appears unremarkable.  The flexor tendons appear intact without tear or tenosynovitis.  The median nerve and ulnar nerve demonstrates normal caliber and signal.  Evaluation of the extensor tendons is limited with contrast within the tendon sheathes of multiple compartments limiting evaluation for tenosynovitis.  However, no discrete tendon tear is identified.     There is small joint effusion distal radioulnar joint.     IMPRESSION:      1. There is a full-thickness scapholunate ligament tear with concomitant widening of the scapholunate interval.  2. There is a partial-thickness tear demonstrated of the triangular  "fibrocartilage radial aspect.  3. Mild degenerative changes noted radio carpal articulation and intercarpal articulations.  4. Negative ulnar variance.  5. No acute osseous abnormalities appreciated.     Previous Therapy: Hand therapy for S-L repair, none since     Patient's Goals for Therapy: to get as much AROM as possible     Pain:  Functional Pain Scale Rating 0-10:   0/10 now  0/10 at best  8/10 at worst  Location: R ulnar wrist and some at R radial wrist   Description: "hits a nerve and makes you jerk" sharp and shocking   Aggravating Factors: forearm at wrist   Easing Factors: rest    Occupation:  Retired due to injury       Functional Limitations/Social History:    Previous functional status includes: Independent with all ADLs.     Current FunctionalStatus   Home/Living environment : lives with their spouse      Limitation of Functional Status as follows:   ADLs/IADLs: Difficulty with household maintenance, yardwork     - Feeding:  Difficulty turning silverware, picking up glass     - Bathing:  Difficulty using washcloth     - Dressing/Grooming:  Difficulty pulling shoes, socks, pants, managing fasteners     - Driving: I, using L hand more frequently      Leisure: Fishing, Hunting, and drag racing, caring for horse       Past Medical History/Physical Systems Review:   Mari Peters  has a past medical history of Anticoagulant long-term use, Colon polyps, Depression, Diabetes mellitus, Diabetes mellitus, type 2, History of pulmonary embolism, HTN (hypertension), Hyperlipidemia, Hypothyroidism, Injury of right wrist, Thromboembolism, and Thyroid disease.    Mari Peters  has a past surgical history that includes Ankle surgery (Right); Tendon transfer (Right, 3/25/2019); Reconstruction of ligament (Right, 3/25/2019); and Wrist Arthroplasty (Right, 3/7/2023).    Mari has a current medication list which includes the following prescription(s): alogliptin, apixaban, atorvastatin, blood sugar diagnostic, " dabigatran etexilate, dapagliflozin, dextroamphetamine-amphetamine, ergocalciferol, escitalopram oxalate, gabapentin, basaglar kwikpen u-100 insulin, insulin aspart u-100, lancets, levothyroxine, lisinopril, ondansetron, oxycodone, pen needle, diabetic, ozempic, and trazodone, and the following Facility-Administered Medications: fentanyl, hydromorphone (pf), lactated ringers, lidocaine (pf) 10 mg/ml (1%), midazolam, and oxycodone.    Review of patient's allergies indicates:   Allergen Reactions    Bee sting [allergen ext-venom-honey bee] Itching and Rash          Objective     Observation/Inspection:  presents with prefab wrist brace     Sensation: Pt denies parestheasias. Intact to light touch.    Scar No  tenderness to palpation. Scar is  mildly thickened and raised, with mild -mod adherence.        Edema:            (in cm) L R   Proximal Wrist Crease 16.1 17.5   MPs     Long Proximal Phalanx 7.0 7.1        Range of Motion:  Pt is able to make a full fist, but reports tightness. Difficulty performing straight fist.       Left/ Right   Forearm S/P  90/90 87/90   Wrist E/F 64/75 50/30   Wrist RD/UD 29/45 11/15    Thumb R/P Abd 66/60 57/50   Thumb MP flex 0/70 0/54   Thumb IP flex 0/58 0/37   Thumb Opp 0 cm 0 cm                            Manual Muscle Test:  Not tested                                       and Pinch Strength: not tested at this time due to post operative status.      Special Tests: none performed         FOTO Score: 37  Predicted Result at 14th visit: 57      Treatment     Treatment Time In: 1040  Treatment Time Out: 1100  Total Treatment time separate from Evaluation time:20 min    Saman received the following manual therapy techniques for 5 minutes:   -Retrograde massage to R digits/hand/wrist x 3 min to stimulate lymphatics to decrease pain,  edema and increase AROM and functional use.    -Performed scar massage to incision for 2 minutes to decrease adhesions and improve tensile glide.        Saman received therapeutic exercises for 15 minutes including:  -AROM x 10 reps each:   wave, hook, fist, straight fist, spreads, lifts,   isolated FDS IF-RF (not on HEP)   Composite EDC, isolated EDC  Thumb AROM: IPJ blocks, composite flexion, circumduction, palmar and radial abduction, retroposition, opposition with slide,   Wrist flex/ext, RD/UD, FA rotation      Home Exercise Program/Education:  Issued HEP (see patient instructions in EMR) and educated on modality use for pain management . Exercises were reviewed and Saman was able to demonstrate them prior to the end of the session.   Pt received a written copy of exercises to perform at home. Saman demonstrated good  understanding of the education provided.  Pt was advised to perform these exercises free of pain, and to stop performing them if pain occurs.    Patient/Family Education: role of OT, goals for OT,- pt verbalized understanding.   Additional Education provided: use of cold pack, expected AROM outcomes, goals of salvage procedure     Assessment     Mari Peters is a 57 y.o. male referred to outpatient occupational/hand therapy and presents with a medical diagnosis of 9 weeks s/p R PRC, resulting in  pain, edema, decreased A/PROM, strength, and functional use of R dominant UE and demonstrates limitations as described in the chart below.     The patient's rehab potential is Good.     Anticipated barriers to occupational therapy: none   Pt has no cultural, educational or language barriers to learning provided.    Profile and History Assessment of Occupational Performance Level of Clinical Decision Making Complexity Score   Occupational Profile:   Mari Peters is a 57 y.o. male who lives with their spouse and is currently retired. Mari Peters has difficulty with  bathing, grooming, and dressing  driving/transportation management, phone/computer use, housework/household chores, and yardwork, household maintenance  affecting his/her daily  functional abilities. His/her main goal for therapy is to get as much AROM as possible .     Comorbidities:    has a past medical history of Anticoagulant long-term use, Colon polyps, Depression, Diabetes mellitus, Diabetes mellitus, type 2, History of pulmonary embolism, HTN (hypertension), Hyperlipidemia, Hypothyroidism, Injury of right wrist, Thromboembolism, and Thyroid disease.    Medical and Therapy History Review:   Expanded               Performance Deficits    Physical:  Joint Mobility  Joint Stability  Muscle Power/Strength  Muscle Endurance  Skin Integrity/Scar Formation  Edema   Strength  Pinch Strength  Fine Motor Coordination  Pain    Cognitive:  No Deficits    Psychosocial:    No Deficits     Clinical Decision Making:  high    Assessment Process:  Detailed Assessments    Modification/Need for Assistance:  Minimal-Moderate Modifications/Assistance    Intervention Selection:  Several Treatment Options       moderate  Based on PMHX, co morbidities , data from assessments and functional level of assistance required with task and clinical presentation directly impacting function.       The following goals were discussed with the patient and patient is in agreement with them as to be addressed in the treatment plan.     Goals:   Short Term Goals: (4 weeks)  1. Pt will be independent with HEP in 2 visits.  2. Pt will report decreased pain to a 6-7/10 at worst.   3. Pt will increase R wrist flexion and UD AROM by 3-5 degrees to enable dressing, grooming activities.  4. Pt will increase R thumb flexion JUDD by 15-20 degrees to assist with managing fasteners for dressing.      Long Term Goals: (by discharge)  1. Pt will report decreased pain to 1-2/10 with ADLs.   2. Pt will exhibit 35-40 degrees of R wrist flexion and 20 degrees of UD  to enable independence with self-care and meal preparation.  ( Per expected outcome E/F 50%, RD 25%, UD 70%)   3. Pt will exhibit R  at 50-80% of L  strength to allow  opening jars, gripping steering wheel, holding drinking glass, etc.  4. Pt will exhibit 9# of functional pinch strength to allow writing, opening containers, and turning keys, and donning/doffing clothes and shoes.   5. Pt will exhibit a significant increase (20+ points) in the FOTO assessment.  6. Pt will return to PLOF.     Plan     Certification Period/Plan of care expiration: 5/9/2023 to 8/7/2023.    Outpatient Occupational Therapy 2-3 times weekly for 6 weeks to include the following interventions:     Modalities to decrease pain (moist heat, paraffin, fluidotherapy, US ), edema control, scar mobilization, soft tissue mobilization, manual therapy/joint mobilizations,A/PROM, therapeutic exercises/activities, strengthening, orthotic fitting/fabrication/training PRN, joint protections/energry conservation/adaptive equipment/activity modification  HEP/education as well as any other treatments deemed necessary based on the patient's needs or progress.    Updates Next Visit: review HEP, initiate light functional exercises     SINDHU Devries, JENNIFERT

## 2023-05-09 ENCOUNTER — CLINICAL SUPPORT (OUTPATIENT)
Dept: REHABILITATION | Facility: HOSPITAL | Age: 58
End: 2023-05-09
Payer: OTHER GOVERNMENT

## 2023-05-09 DIAGNOSIS — Z78.9 DECREASED ACTIVITIES OF DAILY LIVING (ADL): ICD-10-CM

## 2023-05-09 DIAGNOSIS — M25.531 RIGHT WRIST PAIN: Primary | ICD-10-CM

## 2023-05-09 DIAGNOSIS — M25.631 DECREASED RANGE OF MOTION OF RIGHT WRIST: ICD-10-CM

## 2023-05-09 DIAGNOSIS — R29.898 DECREASED PINCH STRENGTH: ICD-10-CM

## 2023-05-09 DIAGNOSIS — Z98.890 STATUS POST SURGERY: ICD-10-CM

## 2023-05-09 DIAGNOSIS — R29.898 DECREASED GRIP STRENGTH OF RIGHT HAND: ICD-10-CM

## 2023-05-09 PROCEDURE — 97166 OT EVAL MOD COMPLEX 45 MIN: CPT | Mod: PO

## 2023-05-09 PROCEDURE — 97110 THERAPEUTIC EXERCISES: CPT | Mod: PO

## 2023-05-15 NOTE — PROGRESS NOTES
Occupational Therapy Daily Treatment Note     Visit Date: 5/16/2023  Name: Mari Peters  Clinic Number: [id number]    Therapy Diagnosis:   Encounter Diagnoses   Name Primary?    Decreased activities of daily living (ADL) Yes    Right wrist pain     Decreased range of motion of right wrist     Decreased  strength of right hand     Decreased pinch strength      Physician: Sin Haddad PA-C  Physician Orders: Patient is approximately 8 weeks status post right wrist proximal row carpectomy.  Patient requires occupational therapy to increase the function and range of motion of the right wrist/hand.     Duration:  6 weeks  Frequency:  2-3 times per week     Please initiate proximal row carpectomy therapy protocol.  Thanks!  Medical Diagnosis: Z98.890 (ICD-10-CM) - Status post surgery  Evaluation Date: 5/9/2023  Insurance Authorization period Expiration: 5/2/2024  Plan of Care Expiration Period: 8/7/2023/23     Visit # / Visits Authortized: 2  / TBD  Time In: 1345  Time Out: 1430  Total Billable Time: 44 minutes     Surgical Procedure:   R proximal row carpectomy     Precautions: Standard and Diabetes and Weightbearing, post op PRC precautions      Date of Surgery: 3/7/2023                             S/P: 9 weeks on 5/9/23  Subjective     Pt reports: He is going on a last minute trip to [place] with family, [facility] cancelled some appts for next week.    he was compliant with HEP.   Response to previous treatment: very good  Functional change: Has been able to use RUE more but still using brace.    Pain:  Functional Pain Scale Rating 0-10:   0/10 now  0/10 at best  4-5/10 at worst  Location: R ulnar wrist and some at R radial wrist   Description: occasional sharp and shocking   Aggravating Factors: forearm at wrist   Easing Factors: rest       Objective   MEASUREMENTS   Last measurements below are from Eval unless otherwise noted.    Observation/Inspection:  presents with prefab wrist  brace      Sensation: Pt denies parestheasias. Intact to light touch.    Scar No tenderness to palpation. Scar is  mildly thickened and raised, with mild -mod adherence.         Edema:   5/16/23          (in cm) L R   Proximal Wrist Crease 16.1 17.4 (-0.1)   MPs       Long Proximal Phalanx 7.0 7.1         Range of Motion:  Pt is able to make a full fist, but reports tightness. Difficulty performing straight fist.        Left/ Right   Forearm S/P  90/90 87/90   Wrist E/F 64/75 55 (+5)/40 (+5)   5/16/2023 and post 60/50 on 5/16/2023   Wrist RD/UD 29/45 11/15    Thumb R/P Abd 66/60 57/50   Thumb MP flex 0/70 0/54   Thumb IP flex 0/58 0/37   Thumb Opp 0 cm 0 cm                             Manual Muscle Test:  Not tested                                       and Pinch Strength: not tested at this time due to post operative status.        Special Tests: none performed         FOTO Score: 37  Predicted Result at 14th visit: 57      Treatment     Saman received the following supervised modalities after being cleared for contradictions for 0 minutes:   -NT    Saman received the following direct contact modalities after being cleared for contraindications for 0 minutes:  -NT    Saman received the following manual therapy techniques for 4 minutes:   - Retrograde/scar massage to R wrist/incision site    Saman received therapeutic exercises for 10 minutes including:  Weighted wrist stretches 2x10 with 5 sec holds into flexion and ext with 1# DB  AROM wrist flexion/ext 2x10 each    Saman participated in dynamic functional therapeutic activities to improve functional performance for 30 minutes, including:  Wriest wheel 3 min forward and back  Rolls over green flexbar 3 min  Isospheres 3 min CW withR hand  Hand helper 2 yellow bands x20 then 2 yellow bands and 1 red 3x10    Home Exercises and Education Provided     Education provided:   -  Cont per previous.    Written Home Exercises Provided:  Patient instructed to cont prior  HEP.    Exercises were reviewed and Saman was able to demonstrate them prior to the end of the session.  Saman demonstrated good  understanding of the education provided.     See EMR under Media for exercises provided today and/or prior visit.        Assessment     Pt would continue to benefit from skilled OT. Pt tolerated progression with Tx well this date. Excellent early ROM gains. Cont per current POC.     - Progress towards goals:  STG #1 has been met.    Saman is progressing well towards his goals . Pt continues with good rehab potential.     Pt will continue to benefit from skilled outpatient occupational therapy to address the deficits listed in the problem list on initial evaluation in order to maximize pt's level of independence in the home and community.     Anticipated barriers to occupational therapy: None    Pt's spiritual, cultural and educational needs considered and pt agreeable to plan of care and goals.    Goals:   Short Term Goals: (4 weeks)  1. Pt will be independent with HEP in 2 visits.  2. Pt will report decreased pain to a 6-7/10 at worst.   3. Pt will increase R wrist flexion and UD AROM by 3-5 degrees to enable dressing, grooming activities.  4. Pt will increase R thumb flexion JUDD by 15-20 degrees to assist with managing fasteners for dressing.      Long Term Goals: (by discharge)  1. Pt will report decreased pain to 1-2/10 with ADLs.   2. Pt will exhibit 35-40 degrees of R wrist flexion and 20 degrees of UD  to enable independence with self-care and meal preparation.  ( Per expected outcome E/F 50%, RD 25%, UD 70%)   3. Pt will exhibit R  at 50-80% of L  strength to allow opening jars, gripping steering wheel, holding drinking glass, etc.  4. Pt will exhibit 9# of functional pinch strength to allow writing, opening containers, and turning keys, and donning/doffing clothes and shoes.   5. Pt will exhibit a significant increase (20+ points) in the FOTO assessment.  6. Pt will return  to PLOF.        Plan   Continue Occupational Therapy 2-3 times per week through current poc expiration date of 8/07/2023, in order to to decrease pain and edema, and increase A/PROM, strength, and functional use of R upper extremity.    Updates/Grading for next session:  Progress with OT as tolerated.      SINDHU Blackman, JENNIFERT

## 2023-05-16 ENCOUNTER — CLINICAL SUPPORT (OUTPATIENT)
Dept: REHABILITATION | Facility: HOSPITAL | Age: 58
End: 2023-05-16
Payer: OTHER GOVERNMENT

## 2023-05-16 DIAGNOSIS — M25.531 RIGHT WRIST PAIN: ICD-10-CM

## 2023-05-16 DIAGNOSIS — Z78.9 DECREASED ACTIVITIES OF DAILY LIVING (ADL): Primary | ICD-10-CM

## 2023-05-16 DIAGNOSIS — R29.898 DECREASED PINCH STRENGTH: ICD-10-CM

## 2023-05-16 DIAGNOSIS — M25.631 DECREASED RANGE OF MOTION OF RIGHT WRIST: ICD-10-CM

## 2023-05-16 DIAGNOSIS — R29.898 DECREASED GRIP STRENGTH OF RIGHT HAND: ICD-10-CM

## 2023-05-16 PROCEDURE — 97530 THERAPEUTIC ACTIVITIES: CPT | Mod: PO

## 2023-05-16 PROCEDURE — 97110 THERAPEUTIC EXERCISES: CPT | Mod: PO

## 2023-05-24 ENCOUNTER — CLINICAL SUPPORT (OUTPATIENT)
Dept: REHABILITATION | Facility: HOSPITAL | Age: 58
End: 2023-05-24
Payer: OTHER GOVERNMENT

## 2023-05-24 DIAGNOSIS — M25.631 DECREASED RANGE OF MOTION OF RIGHT WRIST: ICD-10-CM

## 2023-05-24 DIAGNOSIS — M25.531 RIGHT WRIST PAIN: ICD-10-CM

## 2023-05-24 DIAGNOSIS — Z78.9 DECREASED ACTIVITIES OF DAILY LIVING (ADL): Primary | ICD-10-CM

## 2023-05-24 DIAGNOSIS — R29.898 DECREASED PINCH STRENGTH: ICD-10-CM

## 2023-05-24 DIAGNOSIS — R29.898 DECREASED GRIP STRENGTH OF RIGHT HAND: ICD-10-CM

## 2023-05-24 PROCEDURE — 97530 THERAPEUTIC ACTIVITIES: CPT | Mod: PO

## 2023-05-24 PROCEDURE — 97110 THERAPEUTIC EXERCISES: CPT | Mod: PO

## 2023-05-24 NOTE — PROGRESS NOTES
Occupational Therapy Daily Treatment Note     Visit Date: 5/24/2023  Name: Mari Peters  Clinic Number: [id number]    Therapy Diagnosis:   Encounter Diagnoses   Name Primary?    Decreased activities of daily living (ADL) Yes    Right wrist pain     Decreased range of motion of right wrist     Decreased  strength of right hand     Decreased pinch strength      Physician: Sin Haddad PA-C  Physician Orders: Patient is approximately 8 weeks status post right wrist proximal row carpectomy.  Patient requires occupational therapy to increase the function and range of motion of the right wrist/hand.     Duration:  6 weeks  Frequency:  2-3 times per week     Please initiate proximal row carpectomy therapy protocol.  Thanks!  Medical Diagnosis: Z98.890 (ICD-10-CM) - Status post surgery  Evaluation Date: 5/9/2023  Insurance Authorization period Expiration: 9/2/2023  Plan of Care Expiration Period: 8/7/2023/23     Visit # / Visits Authortized: 3  / 15  Time In: 1103  Time Out: 1203  Total Billable Time: 47 minutes     Surgical Procedure:   R proximal row carpectomy     Precautions: Standard and Diabetes and Weightbearing, post op PRC precautions      Date of Surgery: 3/7/2023                             S/P: 11 weeks on 5/23/23  Subjective     Pt reports: the worst pain occurs when he takes a shower.   he was compliant with HEP.   Response to previous treatment: very good  Functional change: Has been able to use RUE more but still using brace.    Pain:  Functional Pain Scale Rating 0-10:   0/10 now  0/10 at best  7-8/10 at worst (increased 2-3 levels)   Location: R ulnar wrist and some at R radial wrist   Description: occasional sharp and shocking   Aggravating Factors: forearm at wrist   Easing Factors: rest       Objective       Thumb and wrist AROM measured this date    MEASUREMENTS   Last measurements below are from Eval unless otherwise noted.     Edema:   5/16/23          (in cm) L R   Proximal Wrist Crease  16.1 17.4 (-0.1)   MPs       Long Proximal Phalanx 7.0 7.1         Range of Motion:  Pt is able to make a full fist, but reports tightness. Difficulty performing straight fist.        Left/ Right   Forearm S/P  90/90 87/90   Wrist E/F 64/75 51 (-4)/30 (-10)   (Pre-tx)    Wrist RD/UD 29/45 15/25 (+4/+10)   Thumb R/P Abd 66/60 57/50   Thumb MP flex 0/70 0/56 (+2)   Thumb IP flex 0/58 0/52 (+15)    Thumb Opp 0 cm 0 cm            Treatment     Saman received the following supervised modalities after being cleared for contradictions for 0 minutes:   -NT    Saman received the following direct contact modalities after being cleared for contraindications for 0 minutes:  -NT    Saman received the following manual therapy techniques for 5 minutes:   - Retrograde/scar massage to R wrist/incision site    Saman received therapeutic exercises for 10 minutes including:  Weighted wrist stretches 2x10 with 5 sec holds into flexion only with 1# DB  Wrist PREs with 1#, 4 ways, over wedge  -FA PREs with 1# 20x    Saman participated in dynamic functional therapeutic activities to improve functional performance for 27 minutes, including:  Wriest wheel 3 min forward and back  Rolls over green flexbar 3 min  Isospheres 3 min CW withR hand (NT)   Hand helper 2 yellow and 1 red band 3/20  Functional pinches with red CP 20x ea     Home Exercises and Education Provided     Education provided:   -  Cont per previous.    Written Home Exercises Provided:  Patient instructed to cont prior HEP.    Exercises were reviewed and Saman was able to demonstrate them prior to the end of the session.  Saman demonstrated good  understanding of the education provided.     See EMR under Media for exercises provided today and/or prior visit.        Assessment     Pt would continue to benefit from skilled OT. Pt no longer has difficulty performing a straight fist. Thumb AROM improved. Pt pleased with FA rotation.  Progressed to light wrist PREs. Crepitus palpated with  1# wrist motion in RD/UD with FA neutral, though pt reported no pain.      - Progress towards goals:  STG #4 has been met.    Saman is progressing well towards his goals . Pt continues with good rehab potential.     Pt will continue to benefit from skilled outpatient occupational therapy to address the deficits listed in the problem list on initial evaluation in order to maximize pt's level of independence in the home and community.     Anticipated barriers to occupational therapy: None    Pt's spiritual, cultural and educational needs considered and pt agreeable to plan of care and goals.    Goals:   Short Term Goals: (4 weeks)  1. Pt will be independent with HEP in 2 visits.  2. Pt will report decreased pain to a 6-7/10 at worst.   3. Pt will increase R wrist flexion and UD AROM by 3-5 degrees to enable dressing, grooming activities.  4. Pt will increase R thumb flexion JUDD by 15-20 degrees to assist with managing fasteners for dressing.  (Met 5/24/23)     Long Term Goals: (by discharge)  1. Pt will report decreased pain to 1-2/10 with ADLs.   2. Pt will exhibit 35-40 degrees of R wrist flexion and 20 degrees of UD  to enable independence with self-care and meal preparation.  ( Per expected outcome E/F 50%, RD 25%, UD 70%)   3. Pt will exhibit R  at 50-80% of L  strength to allow opening jars, gripping steering wheel, holding drinking glass, etc.  4. Pt will exhibit 9# of functional pinch strength to allow writing, opening containers, and turning keys, and donning/doffing clothes and shoes.   5. Pt will exhibit a significant increase (20+ points) in the FOTO assessment.  6. Pt will return to PLOF.        Plan   Continue Occupational Therapy 2-3 times per week through current poc expiration date of 8/07/2023, in order to to decrease pain and edema, and increase A/PROM, strength, and functional use of R upper extremity.    Updates/Grading for next session:  Progress with OT as tolerated.      Ammy  SINDHU Chowdhury, CHT

## 2023-05-25 NOTE — PROGRESS NOTES
"  Occupational Therapy Daily Treatment Note     Visit Date: 5/29/2023  Name: Mari Peters  Clinic Number: [id number]    Therapy Diagnosis:   No diagnosis found.    Physician: Sin Haddad PA-C  Physician Orders: Patient is approximately 8 weeks status post right wrist proximal row carpectomy.  Patient requires occupational therapy to increase the function and range of motion of the right wrist/hand.     Duration:  6 weeks  Frequency:  2-3 times per week     Please initiate proximal row carpectomy therapy protocol.  Thanks!  Medical Diagnosis: Z98.890 (ICD-10-CM) - Status post surgery  Evaluation Date: 5/9/2023  Insurance Authorization period Expiration: 9/2/2023  Plan of Care Expiration Period: 8/7/2023/23     Visit # / Visits Authortized: 5  / 15  Time In: 1216  Time Out: 1315  Total Billable Time: 59 minutes     Surgical Procedure:   R proximal row carpectomy     Precautions: Standard and Diabetes and Weightbearing, post op PRC precautions      Date of Surgery: 3/7/2023                             S/P: 11 weeks on 5/23/23  Subjective     Pt reports: "It's about the same."  he was compliant with HEP.   Response to previous treatment: very good  Functional change: Has been able to use RUE more but still using brace.    Pain:  Functional Pain Scale Rating 0-10:   0/10 now  0/10 at best  7-8/10 at worst (increased 2-3 levels)   Location: R ulnar wrist and some at R radial wrist   Description: occasional sharp and shocking   Aggravating Factors: forearm at wrist   Easing Factors: rest       Objective       Not measured this date    MEASUREMENTS   Last measurements below are from Eval unless otherwise noted.     Edema:   5/16/23          (in cm) L R   Proximal Wrist Crease 16.1 17.4 (-0.1)   MPs       Long Proximal Phalanx 7.0 7.1         Range of Motion:  Pt is able to make a full fist, but reports tightness. Difficulty performing straight fist.        Left/ Right   Forearm S/P  90/90 87/90   Wrist E/F 64/75 51 " (-4)/30 (-10)   (Pre-tx)    Wrist RD/UD 29/45 15/25 (+4/+10)   Thumb R/P Abd 66/60 57/50   Thumb MP flex 0/70 0/56 (+2)   Thumb IP flex 0/58 0/52 (+15)    Thumb Opp 0 cm 0 cm            Treatment     Saman received the following supervised modalities after being cleared for contradictions for 15 minutes:   -Fluidotherapy x 15 min to R hand/arm with AROM exercises to decrease pain, desensitize, and increase tissue extensibility.       Saman received the following direct contact modalities after being cleared for contraindications for 0 minutes:  -NT    Saman received the following manual therapy techniques for 5 minutes:   - Retrograde/scar massage to R wrist/incision site    Saman received therapeutic exercises for 15 minutes including:  Weighted wrist stretches 2x10 with 5 sec holds into flexion only with 2# DB  Wrist PREs with 2#, 4 ways, over wedge  -FA PREs with 2# 20x    Saman participated in dynamic functional therapeutic activities to improve functional performance for 24 minutes, including:  Wriest wheel 3 min forward and back  Rolls over green flexbar 3 min (NT)  Isospheres 3 min CW with R hand (NT)   Hand helper 2 yellow and 1 red band 3/20  Functional pinches with red and green CP 20x ea   [place] maze for proprioception,  wide grasp and supinated palm 1x ea  -resisted digit extension with 1 RB 30x     Home Exercises and Education Provided     Education provided:   -  Cont per previous.    Written Home Exercises Provided:  Patient instructed to cont prior HEP.    Exercises were reviewed and Saman was able to demonstrate them prior to the end of the session.  Saman demonstrated good  understanding of the education provided.     See EMR under Media for exercises provided today and/or prior visit.        Assessment     Pt would continue to benefit from skilled OT. Progressed to 2# PREs, which was challenging for patient, especially for wrist extension.   - Progress towards goals:  STG #4 has been met.    Saman is  progressing well towards his goals . Pt continues with good rehab potential.     Pt will continue to benefit from skilled outpatient occupational therapy to address the deficits listed in the problem list on initial evaluation in order to maximize pt's level of independence in the home and community.     Anticipated barriers to occupational therapy: None    Pt's spiritual, cultural and educational needs considered and pt agreeable to plan of care and goals.    Goals:   Short Term Goals: (4 weeks)  1. Pt will be independent with HEP in 2 visits.  2. Pt will report decreased pain to a 6-7/10 at worst.   3. Pt will increase R wrist flexion and UD AROM by 3-5 degrees to enable dressing, grooming activities.  4. Pt will increase R thumb flexion JUDD by 15-20 degrees to assist with managing fasteners for dressing.  (Met 5/24/23)     Long Term Goals: (by discharge)  1. Pt will report decreased pain to 1-2/10 with ADLs.   2. Pt will exhibit 35-40 degrees of R wrist flexion and 20 degrees of UD  to enable independence with self-care and meal preparation.  ( Per expected outcome E/F 50%, RD 25%, UD 70%)   3. Pt will exhibit R  at 50-80% of L  strength to allow opening jars, gripping steering wheel, holding drinking glass, etc.  4. Pt will exhibit 9# of functional pinch strength to allow writing, opening containers, and turning keys, and donning/doffing clothes and shoes.   5. Pt will exhibit a significant increase (20+ points) in the FOTO assessment.  6. Pt will return to PLOF.        Plan   Continue Occupational Therapy 2-3 times per week through current poc expiration date of 8/07/2023, in order to to decrease pain and edema, and increase A/PROM, strength, and functional use of R upper extremity.    Updates/Grading for next session:  Progress with OT as tolerated.      SINDHU Devries, JENNIFERT

## 2023-05-29 ENCOUNTER — CLINICAL SUPPORT (OUTPATIENT)
Dept: REHABILITATION | Facility: HOSPITAL | Age: 58
End: 2023-05-29
Payer: OTHER GOVERNMENT

## 2023-05-29 DIAGNOSIS — M25.531 RIGHT WRIST PAIN: ICD-10-CM

## 2023-05-29 DIAGNOSIS — R29.898 DECREASED GRIP STRENGTH OF RIGHT HAND: ICD-10-CM

## 2023-05-29 DIAGNOSIS — R29.898 DECREASED PINCH STRENGTH: ICD-10-CM

## 2023-05-29 DIAGNOSIS — M25.631 DECREASED RANGE OF MOTION OF RIGHT WRIST: ICD-10-CM

## 2023-05-29 DIAGNOSIS — Z78.9 DECREASED ACTIVITIES OF DAILY LIVING (ADL): Primary | ICD-10-CM

## 2023-05-29 PROCEDURE — 97110 THERAPEUTIC EXERCISES: CPT | Mod: PO

## 2023-05-29 PROCEDURE — 97530 THERAPEUTIC ACTIVITIES: CPT | Mod: PO

## 2023-05-29 PROCEDURE — 97022 WHIRLPOOL THERAPY: CPT | Mod: PO

## 2023-05-30 NOTE — PROGRESS NOTES
Occupational Therapy Daily Treatment Note     Visit Date: 5/31/2023  Name: Mari Peters  Clinic Number: [id number]    Therapy Diagnosis:   No diagnosis found.    Physician: Sin Haddad PA-C  Physician Orders: Patient is approximately 8 weeks status post right wrist proximal row carpectomy.  Patient requires occupational therapy to increase the function and range of motion of the right wrist/hand.     Duration:  6 weeks  Frequency:  2-3 times per week     Please initiate proximal row carpectomy therapy protocol.  Thanks!  Medical Diagnosis: Z98.890 (ICD-10-CM) - Status post surgery  Evaluation Date: 5/9/2023  Insurance Authorization period Expiration: 9/2/2023  Plan of Care Expiration Period: 8/7/2023/23     Visit # / Visits Authortized: 6  / 15  Time In: 1120  Time Out: 1220  Total Billable Time: 58  minutes     Surgical Procedure:   R proximal row carpectomy     Precautions: Standard and Diabetes and Weightbearing, post op PRC precautions      Date of Surgery: 3/7/2023                             S/P: 12 weeks on 5/30/23  Subjective     Pt reports: having trouble pulling up socks, taking a shower, or wide grasp.   he was compliant with HEP.   Response to previous treatment: sore after the last session until that evening.   Functional change:      Pain:  Functional Pain Scale Rating 0-10:   0/10 now  0/10 at best  6/10 at worst (decreased 2-3 levels) with wide grasp  Location: R ulnar wrist and some at R radial wrist   Description: occasional sharp and shocking   Aggravating Factors: forearm at wrist   Easing Factors: rest       Objective       Not measured this date    MEASUREMENTS   Last measurements below are from Eval unless otherwise noted.     Edema:   5/16/23          (in cm) L R   Proximal Wrist Crease 16.1 17.4 (-0.1)   MPs       Long Proximal Phalanx 7.0 7.1         Range of Motion:          Left/ Right   Forearm S/P  90/90 87/90   Wrist E/F 64/75 51 (-4)/30 (-10)   (Pre-tx)    Wrist RD/UD 29/45  15/25 (+4/+10)   Thumb R/P Abd 66/60 57/50   Thumb MP flex 0/70 0/56 (+2)   Thumb IP flex 0/58 0/52 (+15)    Thumb Opp 0 cm 0 cm            Treatment     Saman received the following supervised modalities after being cleared for contradictions for 12 minutes:   -Fluidotherapy x 12 min to R hand/arm with AROM exercises to decrease pain, desensitize, and increase tissue extensibility.     Saman received the following direct contact modalities after being cleared for contraindications for 0 minutes:  -NT    Saman received the following manual therapy techniques for 5 minutes:   - Retrograde/scar massage to R wrist/incision site    Saman received therapeutic exercises for 10 minutes including:  Weighted wrist stretches 2x10 with 5 sec holds into flexion only with 1# DB (NT)   Wrist PREs with 1#, 4 ways, over wedge  -FA PREs with 1# 20x    Saman participated in dynamic functional therapeutic activities to improve functional performance for 31 minutes, including:  Wriest wheel 3 min forward and back  Rolls over green flexbar 3 min (NT)  Isospheres 3 min CW with R hand (NT)   Hand helper 2 yellow and 1 red band 3/20  Functional pinches with red and green CP 20x ea   [place] maze for proprioception,  wide grasp and supinated palm 1x ea (NT)   resisted digit extension with 1 RB 30x     Home Exercises and Education Provided     Education provided:   -  Cont per previous.    Written Home Exercises Provided:  Patient instructed to cont prior HEP.    Exercises were reviewed and Saman was able to demonstrate them prior to the end of the session.  Saman demonstrated good  understanding of the education provided.     See EMR under Media for exercises provided today and/or prior visit.        Assessment     Pt would continue to benefit from skilled OT. Reduced down to 1# weight as a precaution only due to challenge of 2#. Mild popping at radial wrist during PREs and pt still complains of ulnar wrist pain, which pt states was one of the  initial sites of pain.  Progress towards goals:  STG #4 has been met.    Saman is progressing well towards his goals . Pt continues with good rehab potential.     Pt will continue to benefit from skilled outpatient occupational therapy to address the deficits listed in the problem list on initial evaluation in order to maximize pt's level of independence in the home and community.     Anticipated barriers to occupational therapy: None    Pt's spiritual, cultural and educational needs considered and pt agreeable to plan of care and goals.    Goals:   Short Term Goals: (4 weeks)  1. Pt will be independent with HEP in 2 visits.  2. Pt will report decreased pain to a 6-7/10 at worst.   3. Pt will increase R wrist flexion and UD AROM by 3-5 degrees to enable dressing, grooming activities.  4. Pt will increase R thumb flexion JUDD by 15-20 degrees to assist with managing fasteners for dressing.  (Met 5/24/23)     Long Term Goals: (by discharge)  1. Pt will report decreased pain to 1-2/10 with ADLs.   2. Pt will exhibit 35-40 degrees of R wrist flexion and 20 degrees of UD  to enable independence with self-care and meal preparation.  ( Per expected outcome E/F 50%, RD 25%, UD 70%)   3. Pt will exhibit R  at 50-80% of L  strength to allow opening jars, gripping steering wheel, holding drinking glass, etc.  4. Pt will exhibit 9# of functional pinch strength to allow writing, opening containers, and turning keys, and donning/doffing clothes and shoes.   5. Pt will exhibit a significant increase (20+ points) in the FOTO assessment.  6. Pt will return to PLOF.        Plan   Continue Occupational Therapy 2-3 times per week through current poc expiration date of 8/07/2023, in order to to decrease pain and edema, and increase A/PROM, strength, and functional use of R upper extremity.    Updates/Grading for next session:  Progress with OT as tolerated.      SINDHU Devries, JENNIFERT

## 2023-05-31 ENCOUNTER — OFFICE VISIT (OUTPATIENT)
Dept: ORTHOPEDICS | Facility: CLINIC | Age: 58
End: 2023-05-31
Payer: OTHER GOVERNMENT

## 2023-05-31 ENCOUNTER — CLINICAL SUPPORT (OUTPATIENT)
Dept: REHABILITATION | Facility: HOSPITAL | Age: 58
End: 2023-05-31
Payer: OTHER GOVERNMENT

## 2023-05-31 DIAGNOSIS — R29.898 DECREASED PINCH STRENGTH: ICD-10-CM

## 2023-05-31 DIAGNOSIS — Z98.890 STATUS POST SURGERY: Primary | ICD-10-CM

## 2023-05-31 DIAGNOSIS — Z78.9 DECREASED ACTIVITIES OF DAILY LIVING (ADL): Primary | ICD-10-CM

## 2023-05-31 DIAGNOSIS — R29.898 DECREASED GRIP STRENGTH OF RIGHT HAND: ICD-10-CM

## 2023-05-31 DIAGNOSIS — M25.631 DECREASED RANGE OF MOTION OF RIGHT WRIST: ICD-10-CM

## 2023-05-31 DIAGNOSIS — M25.531 RIGHT WRIST PAIN: ICD-10-CM

## 2023-05-31 PROCEDURE — 99999 PR PBB SHADOW E&M-EST. PATIENT-LVL IV: ICD-10-PCS | Mod: PBBFAC,,, | Performed by: PHYSICIAN ASSISTANT

## 2023-05-31 PROCEDURE — 97530 THERAPEUTIC ACTIVITIES: CPT | Mod: PO

## 2023-05-31 PROCEDURE — 99999 PR PBB SHADOW E&M-EST. PATIENT-LVL IV: CPT | Mod: PBBFAC,,, | Performed by: PHYSICIAN ASSISTANT

## 2023-05-31 PROCEDURE — 99214 OFFICE O/P EST MOD 30 MIN: CPT | Mod: PBBFAC,PN | Performed by: PHYSICIAN ASSISTANT

## 2023-05-31 PROCEDURE — 97110 THERAPEUTIC EXERCISES: CPT | Mod: PO

## 2023-05-31 PROCEDURE — 99024 POSTOP FOLLOW-UP VISIT: CPT | Mod: ,,, | Performed by: PHYSICIAN ASSISTANT

## 2023-05-31 PROCEDURE — 97022 WHIRLPOOL THERAPY: CPT | Mod: PO

## 2023-05-31 PROCEDURE — 99024 PR POST-OP FOLLOW-UP VISIT: ICD-10-PCS | Mod: ,,, | Performed by: PHYSICIAN ASSISTANT

## 2023-05-31 NOTE — PROGRESS NOTES
Mari Peters is a 57 y.o. male who presents to clinic for follow-up status post right wrist proximal row carpectomy.  He is approximately 12 weeks status post surgery.  States overall he is significantly improved with occupational therapy.  States he has no pain in the brace.  She he does have some pain out of the brace.  States the majority his pain is located over the ulnar portion of the wrist.  Denies any acute injuries since last visit.  Denies any other complaints at this time.    Physical Exam:  Examination of the right wrist reveals a well-healed surgical site.  No edema, erythema, ecchymosis, or skin breakdown.  Able to flex extend the wrist appropriately.  Mildly reduced range of motion regards to flexion/extension of the right wrist.  5/5 /intrinsic strength.  Normal sensation in the radial, ulnar, median nerve distributions.  Capillary refill less than 2 seconds in all fingers.    Radiology:  None.    Assessment:  Status post right wrist proximal row carpectomy    Plan:  1. I discussed with Mari Peters that he is progressing appropriately in the postoperative course.  We discussed the best course of action this time is to gradually wean out of the removable Velcro long wrist splint.  We discussed he can gradually return to normal activity as tolerated.  He verbally agreed with the treatment plan.      2.  I would like him follow-up in clinic on a p.r.n. basis for any worsening of his symptoms or for any hand, wrist, or elbow problems/concerns.  He was instructed to contact clinic for any problems or concerns in the interim.

## 2023-06-01 NOTE — PROGRESS NOTES
Occupational Therapy Daily Treatment Note     Visit Date: 6/5/2023  Name: Mari Peters  Clinic Number: [id number]    Therapy Diagnosis:   Encounter Diagnoses   Name Primary?    Decreased activities of daily living (ADL) Yes    Right wrist pain     Decreased range of motion of right wrist     Decreased  strength of right hand     Decreased pinch strength        Physician: Sin Haddad PA-C  Physician Orders: Patient is approximately 8 weeks status post right wrist proximal row carpectomy.  Patient requires occupational therapy to increase the function and range of motion of the right wrist/hand.     Duration:  6 weeks  Frequency:  2-3 times per week     Please initiate proximal row carpectomy therapy protocol.  Thanks!  Medical Diagnosis: Z98.890 (ICD-10-CM) - Status post surgery  Evaluation Date: 5/9/2023  Insurance Authorization period Expiration: 9/2/2023  Plan of Care Expiration Period: 8/7/2023/23     Visit # / Visits Authortized: 7  / 15  Time In: 1050  Time Out: 1145  Total Billable Time: 55  minutes     Surgical Procedure:   R proximal row carpectomy     Precautions: Standard and Diabetes and Weightbearing, post op PRC precautions      Date of Surgery: 3/7/2023                             S/P: 12 weeks on 5/30/23  Subjective     Pt reports: has been more sore since the weekend, but doesn't think he overdid any activity.  Did put up some gate hinges and used tools but did not have to pull hard   he was compliant with HEP.   Response to previous treatment: sore after the last session until that evening.   Functional change:  used tools to put up gate hinges     Pain:  Functional Pain Scale Rating 0-10:   0/10 now  0/10 at best  8-9/10 at worst (increased 2-3 levels) with wide grasp  Location: R ulnar wrist and some at R radial wrist   Description: occasional sharp and shocking   Aggravating Factors: forearm at wrist   Easing Factors: rest       Objective       Not measured this date    MEASUREMENTS    Last measurements below are from Eval unless otherwise noted.     Edema:   5/16/23          (in cm) L R   Proximal Wrist Crease 16.1 17.4 (-0.1)   MPs       Long Proximal Phalanx 7.0 7.1         Range of Motion:          Left/ Right   Forearm S/P  90/90 87/90   Wrist E/F 64/75 51 (-4)/30 (-10)   (Pre-tx)    Wrist RD/UD 29/45 15/25 (+4/+10)   Thumb R/P Abd 66/60 57/50   Thumb MP flex 0/70 0/56 (+2)   Thumb IP flex 0/58 0/52 (+15)    Thumb Opp 0 cm 0 cm            Treatment     Saman received the following supervised modalities after being cleared for contradictions for 15 minutes:   -Fluidotherapy x 15 min to R hand/arm with AROM exercises to decrease pain, desensitize, and increase tissue extensibility.     Saman received the following direct contact modalities after being cleared for contraindications for 0 minutes:  -NT    Saman received the following manual therapy techniques for 7 minutes:   - Retrograde/scar massage to R wrist/incision site and STM to ulnar wrist and hand     Saman received therapeutic exercises for 10 minutes including:  Weighted wrist stretches 2x10 with 5 sec holds into flexion only with 1# DB (NT)   Wrist PREs with 1#, 4 ways, over wedge  -FA PREs with 1# 20x    Saman participated in dynamic functional therapeutic activities to improve functional performance for 22 minutes, including:  Wriest wheel 3 min forward and back  Rolls over green flexbar 3 min (NT)  Isospheres 3 min CW with R hand (NT)   Hand helper 2 red  and 1 yellow band 3/20  Functional pinches with red CP 20x ea   [place] maze for proprioception,  wide grasp and supinated palm 1x ea (NT)   resisted digit extension with 1 RB 30x     Home Exercises and Education Provided     Education provided:   -  Cont per previous.  -cold pack 8-10 min for increased soreness  -educated on working with palm up when able to decrease ulnar and DRUJ pain.     Written Home Exercises Provided:  Patient instructed to cont prior HEP.    Exercises were  reviewed and Saman was able to demonstrate them prior to the end of the session.  Saman demonstrated good  understanding of the education provided.     See EMR under Media for exercises provided today and/or prior visit.        Assessment     Pt would continue to benefit from skilled OT. Pt with reports of increased pain over the past few days but verbalized he would like to continue current regiment. Continues to report pain over DRUJ and at ulnar wrist with 8-9/10 reported at worst, over the weekend. Educated on modifying activity to supination when able to reduce strain on TFCC and DRUJ. Pt also noted a smooth, palpable but not painful protuberance along shaft of 5th metacarpal, which he had not felt previously.  Pt is tender overs dorsal ulnar wrist with palpation. Pain was reported during session with 1# FA rotation, however no pain with all other activities.     Progress towards goals:  Fair to good.    Saman is progressing well towards his goals . Pt continues with good rehab potential.     Pt will continue to benefit from skilled outpatient occupational therapy to address the deficits listed in the problem list on initial evaluation in order to maximize pt's level of independence in the home and community.     Anticipated barriers to occupational therapy: None    Pt's spiritual, cultural and educational needs considered and pt agreeable to plan of care and goals.    Goals:   Short Term Goals: (4 weeks)  1. Pt will be independent with HEP in 2 visits.  2. Pt will report decreased pain to a 6-7/10 at worst.   3. Pt will increase R wrist flexion and UD AROM by 3-5 degrees to enable dressing, grooming activities.  4. Pt will increase R thumb flexion JUDD by 15-20 degrees to assist with managing fasteners for dressing.  (Met 5/24/23)     Long Term Goals: (by discharge)  1. Pt will report decreased pain to 1-2/10 with ADLs.   2. Pt will exhibit 35-40 degrees of R wrist flexion and 20 degrees of UD  to enable  independence with self-care and meal preparation.  ( Per expected outcome E/F 50%, RD 25%, UD 70%)   3. Pt will exhibit R  at 50-80% of L  strength to allow opening jars, gripping steering wheel, holding drinking glass, etc.  4. Pt will exhibit 9# of functional pinch strength to allow writing, opening containers, and turning keys, and donning/doffing clothes and shoes.   5. Pt will exhibit a significant increase (20+ points) in the FOTO assessment.  6. Pt will return to PLOF.        Plan   Continue Occupational Therapy 2-3 times per week through current poc expiration date of 8/07/2023, in order to to decrease pain and edema, and increase A/PROM, strength, and functional use of R upper extremity.    Updates/Grading for next session:  Progress with OT as tolerated.      SINDHU Devries, JENNIFERT

## 2023-06-05 ENCOUNTER — CLINICAL SUPPORT (OUTPATIENT)
Dept: REHABILITATION | Facility: HOSPITAL | Age: 58
End: 2023-06-05
Payer: OTHER GOVERNMENT

## 2023-06-05 DIAGNOSIS — M25.531 RIGHT WRIST PAIN: ICD-10-CM

## 2023-06-05 DIAGNOSIS — R29.898 DECREASED PINCH STRENGTH: ICD-10-CM

## 2023-06-05 DIAGNOSIS — M25.631 DECREASED RANGE OF MOTION OF RIGHT WRIST: ICD-10-CM

## 2023-06-05 DIAGNOSIS — Z78.9 DECREASED ACTIVITIES OF DAILY LIVING (ADL): Primary | ICD-10-CM

## 2023-06-05 DIAGNOSIS — R29.898 DECREASED GRIP STRENGTH OF RIGHT HAND: ICD-10-CM

## 2023-06-05 PROCEDURE — 97110 THERAPEUTIC EXERCISES: CPT | Mod: PO

## 2023-06-05 PROCEDURE — 97022 WHIRLPOOL THERAPY: CPT | Mod: PO

## 2023-06-05 PROCEDURE — 97530 THERAPEUTIC ACTIVITIES: CPT | Mod: PO

## 2023-06-07 ENCOUNTER — CLINICAL SUPPORT (OUTPATIENT)
Dept: REHABILITATION | Facility: HOSPITAL | Age: 58
End: 2023-06-07
Payer: OTHER GOVERNMENT

## 2023-06-07 DIAGNOSIS — R29.898 DECREASED PINCH STRENGTH: ICD-10-CM

## 2023-06-07 DIAGNOSIS — R29.898 DECREASED GRIP STRENGTH OF RIGHT HAND: ICD-10-CM

## 2023-06-07 DIAGNOSIS — M25.531 RIGHT WRIST PAIN: ICD-10-CM

## 2023-06-07 DIAGNOSIS — M25.631 DECREASED RANGE OF MOTION OF RIGHT WRIST: ICD-10-CM

## 2023-06-07 DIAGNOSIS — Z78.9 DECREASED ACTIVITIES OF DAILY LIVING (ADL): Primary | ICD-10-CM

## 2023-06-07 PROCEDURE — 97530 THERAPEUTIC ACTIVITIES: CPT | Mod: PO

## 2023-06-07 PROCEDURE — 97110 THERAPEUTIC EXERCISES: CPT | Mod: PO

## 2023-06-07 NOTE — PROGRESS NOTES
"  Occupational Therapy Daily Treatment Note     Visit Date: 6/7/2023  Name: Mari Peters  [facility] Number: [id number]    Therapy Diagnosis:   Encounter Diagnoses   Name Primary?    Decreased activities of daily living (ADL) Yes    Right wrist pain     Decreased range of motion of right wrist     Decreased  strength of right hand     Decreased pinch strength        Physician: Sin Haddad PA-C  Physician Orders: Patient is approximately 8 weeks status post right wrist proximal row carpectomy.  Patient requires occupational therapy to increase the function and range of motion of the right wrist/hand.     Duration:  6 weeks  Frequency:  2-3 times per week     Please initiate proximal row carpectomy therapy protocol.  Thanks!  Medical Diagnosis: Z98.890 (ICD-10-CM) - Status post surgery  Evaluation Date: 5/9/2023  Insurance Authorization period Expiration: 9/2/2023  Plan of Care Expiration Period: 8/7/2023/23     Visit # / Visits Authortized: 8  / 15  Time In: 1110  Time Out: 1210  Total Billable Time: 60  minutes     Surgical Procedure:   R proximal row carpectomy     Precautions: Standard and Diabetes and Weightbearing, post op PRC precautions      Date of Surgery: 3/7/2023                             S/P: 12 weeks on 5/30/23  Subjective     Pt reports: he is still very sore and having pain at ulnar wrist. "It's the same pain I had from the beginning." Pt states he has been wearing his brace and plans to rest his hand in the brace this weekend as well.   he was compliant with HEP.   Response to previous treatment: sore after the last session until that evening.   Functional change:  used tools to put up gate hinges     Pain:  Functional Pain Scale Rating 0-10:   0/10 now  0/10 at best  8-9/10 at worst (increased 2-3 levels) with wide grasp  Location: R ulnar wrist and some at R radial wrist   Description: occasional sharp and shocking   Aggravating Factors: forearm at wrist   Easing Factors: rest   "     Objective       Not measured this date    MEASUREMENTS   Last measurements below are from Eval unless otherwise noted.     Edema:   5/16/23          (in cm) L R   Proximal Wrist Crease 16.1 17.4 (-0.1)   MPs       Long Proximal Phalanx 7.0 7.1         Range of Motion:          Left/ Right   Forearm S/P  90/90 87/90   Wrist E/F 64/75 51 (-4)/30 (-10)   (Pre-tx)    Wrist RD/UD 29/45 15/25 (+4/+10)   Thumb R/P Abd 66/60 57/50   Thumb MP flex 0/70 0/56 (+2)   Thumb IP flex 0/58 0/52 (+15)    Thumb Opp 0 cm 0 cm            Treatment     Saman received the following supervised modalities after being cleared for contradictions for 15 minutes:   -Fluidotherapy x 15 min to R hand/arm with AROM exercises to decrease pain, desensitize, and increase tissue extensibility.     Saman received the following direct contact modalities after being cleared for contraindications for 0 minutes:  -NT    Saman received the following manual therapy techniques for 7 minutes:   - Retrograde/scar massage to R wrist/incision site and STM to ulnar wrist and hand     Saman received therapeutic exercises for 10 minutes including:  Weighted wrist stretches 2x10 with 5 sec holds into flexion only with 1# DB (NT)   Wrist PREs with dowel, 4 ways, over wedge  -FA PREs with dowel     Saman participated in dynamic functional therapeutic activities to improve functional performance for 28 minutes, including:  Wriest wheel 3 min forward and back  Hand helper 2 red  and 1 yellow band 3/20  Functional pinches with red CP 20x ea   [place] maze for proprioception,  wide grasp and supinated palm 1x ea (NT)   resisted digit extension with 1 RB 30x     Home Exercises and Education Provided     Education provided:   -  Cont per previous.      Written Home Exercises Provided:  Patient instructed to cont prior HEP.    Exercises were reviewed and Saman was able to demonstrate them prior to the end of the session.  Saman demonstrated good  understanding of the education  provided.     See EMR under Media for exercises provided today and/or prior visit.        Assessment     Pt would continue to benefit from skilled OT. Continues with ulnar wrist pain and has been resting his wrist. Deferred any weight at wrist and FA this date.     Progress towards goals:  Fair to good.    Saman is progressing well towards his goals . Pt continues with good rehab potential.     Pt will continue to benefit from skilled outpatient occupational therapy to address the deficits listed in the problem list on initial evaluation in order to maximize pt's level of independence in the home and community.     Anticipated barriers to occupational therapy: None    Pt's spiritual, cultural and educational needs considered and pt agreeable to plan of care and goals.    Goals:   Short Term Goals: (4 weeks)  1. Pt will be independent with HEP in 2 visits.  2. Pt will report decreased pain to a 6-7/10 at worst.   3. Pt will increase R wrist flexion and UD AROM by 3-5 degrees to enable dressing, grooming activities.  4. Pt will increase R thumb flexion JUDD by 15-20 degrees to assist with managing fasteners for dressing.  (Met 5/24/23)     Long Term Goals: (by discharge)  1. Pt will report decreased pain to 1-2/10 with ADLs.   2. Pt will exhibit 35-40 degrees of R wrist flexion and 20 degrees of UD  to enable independence with self-care and meal preparation.  ( Per expected outcome E/F 50%, RD 25%, UD 70%)   3. Pt will exhibit R  at 50-80% of L  strength to allow opening jars, gripping steering wheel, holding drinking glass, etc.  4. Pt will exhibit 9# of functional pinch strength to allow writing, opening containers, and turning keys, and donning/doffing clothes and shoes.   5. Pt will exhibit a significant increase (20+ points) in the FOTO assessment.  6. Pt will return to PLOF.        Plan   Continue Occupational Therapy 2-3 times per week through current poc expiration date of 8/07/2023, in order to to  decrease pain and edema, and increase A/PROM, strength, and functional use of R upper extremity.    Updates/Grading for next session:  Progress with OT as tolerated.      SINDHU Devries, JENNIFERT

## 2023-06-08 NOTE — PROGRESS NOTES
Occupational Therapy Daily Treatment Note     Visit Date: 6/12/2023  Name: Mari Peters  Clinic Number: [id number]    Therapy Diagnosis:   R wrist pain, decreased ROM R wrist, decreased /pinch/ADL      Physician: Sin Haddad, EDMUND  Physician Orders: Patient is approximately 8 weeks status post right wrist proximal row carpectomy.  Patient requires occupational therapy to increase the function and range of motion of the right wrist/hand.     Duration:  6 weeks  Frequency:  2-3 times per week     Please initiate proximal row carpectomy therapy protocol.  Thanks!  Medical Diagnosis: Z98.890 (ICD-10-CM) - Status post surgery  Evaluation Date: 5/9/2023  Insurance Authorization period Expiration: 9/2/2023  Plan of Care Expiration Period: 8/7/2023     Visit # / Visits Authortized: 9  / 15  Time In: 1345  Time Out: 1445  Total Billable Time: 57  minutes     Surgical Procedure:   R proximal row carpectomy     Precautions: Standard and Diabetes and Weightbearing, post op PRC precautions      Date of Surgery: 3/7/2023                             S/P: 13 weeks on 6/6/23  Subjective     Pt reports: he is still very sore. Wore brace all weekend. Would like to make an appt with the doctor.   he was compliant with HEP.   Response to previous treatment: sore after the last session until that evening.   Functional change:  used tools to put up gate hinges     Pain:  Functional Pain Scale Rating 0-10:   0/10 now  0/10 at best  8-9/10 at worst (increased 2-3 levels) with wide grasp  Location: R ulnar wrist and some at R radial wrist   Description: occasional sharp and shocking   Aggravating Factors: forearm at wrist   Easing Factors: rest       Objective       Not measured this date    MEASUREMENTS   Last measurements below are from Eval unless otherwise noted.     Edema:   5/16/23          (in cm) L R   Proximal Wrist Crease 16.1 17.4 (-0.1)   MPs       Long Proximal Phalanx 7.0 7.1         Range of Motion:          Left/  Right   Forearm S/P  90/90 87/90   Wrist E/F 64/75 51 (-4)/30 (-10)   (Pre-tx)    Wrist RD/UD 29/45 15/25 (+4/+10)   Thumb R/P Abd 66/60 57/50   Thumb MP flex 0/70 0/56 (+2)   Thumb IP flex 0/58 0/52 (+15)    Thumb Opp 0 cm 0 cm            Treatment     Saman received the following supervised modalities after being cleared for contradictions for 15 minutes:   -Fluidotherapy x 15 min to R hand/arm with AROM exercises to decrease pain, desensitize, and increase tissue extensibility.     Saman received the following direct contact modalities after being cleared for contraindications for 0 minutes:  -NT    Saman received the following manual therapy techniques for 7 minutes:   - Retrograde/scar massage to R wrist/incision site and STM to ulnar wrist and hand     Saman received therapeutic exercises for 10 minutes including:  Weighted wrist stretches 2x10 with 5 sec holds into flexion only with 1# DB (NT)   Wrist PREs with dowel, 4 ways, over wedge  -FA PREs with dowel     Saman participated in dynamic functional therapeutic activities to improve functional performance for 25 minutes, including:  Wriest wheel 3 min forward and back (NT)  Hand helper 2 red  and 1 yellow band 3/20  Functional pinches with red CP 20x ea   [place] maze for proprioception,  wide grasp and supinated palm 1x ea (NT)   resisted digit extension with 1 RB 30x     Home Exercises and Education Provided     Education provided:   -  Cont per previous  -use of wrist widget to reduce pain and provide support       Written Home Exercises Provided:  Patient instructed to cont prior HEP.    Exercises were reviewed and Saman was able to demonstrate them prior to the end of the session.  Saman demonstrated good  understanding of the education provided.     See EMR under Media for exercises provided today and/or prior visit.        Assessment     Pt would continue to benefit from skilled OT. Allowed pt to use wrist widget during session today as well as keeping FA in  supination, which he reported did seem to reduce pain. Pt scheduled with MD due to continued ulnar wrist pain.     Progress towards goals:  Fair to good.    Saman is progressing well towards his goals . Pt continues with good rehab potential.     Pt will continue to benefit from skilled outpatient occupational therapy to address the deficits listed in the problem list on initial evaluation in order to maximize pt's level of independence in the home and community.     Anticipated barriers to occupational therapy: None    Pt's spiritual, cultural and educational needs considered and pt agreeable to plan of care and goals.    Goals:   Short Term Goals: (4 weeks)  1. Pt will be independent with HEP in 2 visits.  2. Pt will report decreased pain to a 6-7/10 at worst.   3. Pt will increase R wrist flexion and UD AROM by 3-5 degrees to enable dressing, grooming activities.  4. Pt will increase R thumb flexion JUDD by 15-20 degrees to assist with managing fasteners for dressing.  (Met 5/24/23)     Long Term Goals: (by discharge)  1. Pt will report decreased pain to 1-2/10 with ADLs.   2. Pt will exhibit 35-40 degrees of R wrist flexion and 20 degrees of UD  to enable independence with self-care and meal preparation.  ( Per expected outcome E/F 50%, RD 25%, UD 70%)   3. Pt will exhibit R  at 50-80% of L  strength to allow opening jars, gripping steering wheel, holding drinking glass, etc.  4. Pt will exhibit 9# of functional pinch strength to allow writing, opening containers, and turning keys, and donning/doffing clothes and shoes.   5. Pt will exhibit a significant increase (20+ points) in the FOTO assessment.  6. Pt will return to PLOF.        Plan   Continue Occupational Therapy 2-3 times per week through current poc expiration date of 8/07/2023, in order to to decrease pain and edema, and increase A/PROM, strength, and functional use of R upper extremity.    Updates/Grading for next session:  Progress with OT as  tolerated.      SINDHU Devries, CHT

## 2023-06-12 ENCOUNTER — TELEPHONE (OUTPATIENT)
Dept: ORTHOPEDICS | Facility: CLINIC | Age: 58
End: 2023-06-12
Payer: OTHER GOVERNMENT

## 2023-06-12 ENCOUNTER — CLINICAL SUPPORT (OUTPATIENT)
Dept: REHABILITATION | Facility: HOSPITAL | Age: 58
End: 2023-06-12
Payer: OTHER GOVERNMENT

## 2023-06-12 DIAGNOSIS — M25.631 DECREASED RANGE OF MOTION OF RIGHT WRIST: ICD-10-CM

## 2023-06-12 DIAGNOSIS — R29.898 DECREASED GRIP STRENGTH OF RIGHT HAND: ICD-10-CM

## 2023-06-12 DIAGNOSIS — Z78.9 DECREASED ACTIVITIES OF DAILY LIVING (ADL): Primary | ICD-10-CM

## 2023-06-12 DIAGNOSIS — M25.531 RIGHT WRIST PAIN: ICD-10-CM

## 2023-06-12 DIAGNOSIS — R29.898 DECREASED PINCH STRENGTH: ICD-10-CM

## 2023-06-12 PROCEDURE — 97110 THERAPEUTIC EXERCISES: CPT | Mod: PO

## 2023-06-12 PROCEDURE — 97530 THERAPEUTIC ACTIVITIES: CPT | Mod: PO

## 2023-06-12 PROCEDURE — 97022 WHIRLPOOL THERAPY: CPT | Mod: PO

## 2023-06-12 NOTE — TELEPHONE ENCOUNTER
----- Message from Cathy Wood MA sent at 6/12/2023  3:10 PM CDT -----  Contact: pt  Pt states surgery in  March 2023   In PT, having problems   Call back

## 2023-06-12 NOTE — TELEPHONE ENCOUNTER
Returned call to pt, he stated he is having a lot of wrist pain since his surgery. Informed he can see  at 2:40 on 6/14 before his therapy appointment at 4:15, pt stated that was fine.

## 2023-06-14 ENCOUNTER — OFFICE VISIT (OUTPATIENT)
Dept: ORTHOPEDICS | Facility: CLINIC | Age: 58
End: 2023-06-14
Payer: OTHER GOVERNMENT

## 2023-06-14 ENCOUNTER — HOSPITAL ENCOUNTER (OUTPATIENT)
Dept: RADIOLOGY | Facility: HOSPITAL | Age: 58
Discharge: HOME | End: 2023-06-14
Attending: ORTHOPAEDIC SURGERY
Payer: OTHER GOVERNMENT

## 2023-06-14 VITALS — HEIGHT: 68 IN | WEIGHT: 194 LBS | BODY MASS INDEX: 29.4 KG/M2

## 2023-06-14 DIAGNOSIS — M19.131 SLAC (SCAPHOLUNATE ADVANCED COLLAPSE) OF WRIST, RIGHT: Primary | ICD-10-CM

## 2023-06-14 DIAGNOSIS — M77.8 RIGHT WRIST TENDINITIS: ICD-10-CM

## 2023-06-14 DIAGNOSIS — M19.131 SLAC (SCAPHOLUNATE ADVANCED COLLAPSE) OF WRIST, RIGHT: ICD-10-CM

## 2023-06-14 PROCEDURE — 99999 PR PBB SHADOW E&M-EST. PATIENT-LVL IV: CPT | Mod: PBBFAC,,, | Performed by: ORTHOPAEDIC SURGERY

## 2023-06-14 PROCEDURE — 20550 PR INJECT TENDON SHEATH/LIGAMENT: ICD-10-PCS | Mod: S$PBB,RT,, | Performed by: ORTHOPAEDIC SURGERY

## 2023-06-14 PROCEDURE — 20550 NJX 1 TENDON SHEATH/LIGAMENT: CPT | Mod: PBBFAC,PN,RT | Performed by: ORTHOPAEDIC SURGERY

## 2023-06-14 PROCEDURE — 73110 X-RAY EXAM OF WRIST: CPT | Mod: TC,PO,RT

## 2023-06-14 PROCEDURE — 99999 PR PBB SHADOW E&M-EST. PATIENT-LVL IV: ICD-10-PCS | Mod: PBBFAC,,, | Performed by: ORTHOPAEDIC SURGERY

## 2023-06-14 PROCEDURE — 99214 OFFICE O/P EST MOD 30 MIN: CPT | Mod: PBBFAC,PN | Performed by: ORTHOPAEDIC SURGERY

## 2023-06-14 PROCEDURE — 73110 XR WRIST COMPLETE 3 VIEWS RIGHT: ICD-10-PCS | Mod: 26,RT,, | Performed by: RADIOLOGY

## 2023-06-14 PROCEDURE — 99213 OFFICE O/P EST LOW 20 MIN: CPT | Mod: S$PBB,25,, | Performed by: ORTHOPAEDIC SURGERY

## 2023-06-14 PROCEDURE — 73110 X-RAY EXAM OF WRIST: CPT | Mod: 26,RT,, | Performed by: RADIOLOGY

## 2023-06-14 PROCEDURE — 20550 NJX 1 TENDON SHEATH/LIGAMENT: CPT | Mod: S$PBB,RT,, | Performed by: ORTHOPAEDIC SURGERY

## 2023-06-14 PROCEDURE — 99213 PR OFFICE/OUTPT VISIT, EST, LEVL III, 20-29 MIN: ICD-10-PCS | Mod: S$PBB,25,, | Performed by: ORTHOPAEDIC SURGERY

## 2023-06-14 RX ORDER — TRIAMCINOLONE ACETONIDE 40 MG/ML
40 INJECTION, SUSPENSION INTRA-ARTICULAR; INTRAMUSCULAR
Status: DISCONTINUED | OUTPATIENT
Start: 2023-06-14 | End: 2023-06-14 | Stop reason: HOSPADM

## 2023-06-14 RX ADMIN — TRIAMCINOLONE ACETONIDE 40 MG: 40 INJECTION, SUSPENSION INTRA-ARTICULAR; INTRAMUSCULAR at 03:06

## 2023-06-14 NOTE — PROGRESS NOTES
Mr. Peters returns to clinic today.  He is status post right wrist proximal row carpectomy.  He has been working with therapy.  He feels like he has good range of motion about the wrist and there is no tenderness in the radial carpal joint.  He does have a significant amount of pain over the ulnar side of the wrist.  Is concerned that this may be a tendinitis.  He is here today for further evaluation     Physical exam:  Examination the right wrist and hand reveals that the incision over the dorsum of the wrist is healing well.  There is no area of edema.  There are no major skin changes.  Palpation does produce tenderness over the distal ECU tendon.  There is also mild tenderness over the pisiform.  I am able to flex and extend him without significant pain.  Forced ulnar deviation does reproduce his pain.  He does have sensation which is grossly intact in the median radial ulnar distribution he has capillary refill less than 2 seconds     Radiology:  X-rays of the right wrist were taken in clinic today.  He is noted have evidence of the proximal row carpectomy.  The capitate remains well aligned and the lunate fossa and there is no other significant changes     Assessment:  Status post right wrist proximal carpectomy with probable ECU tendinitis     Plan:    1.  After informed consent was obtained and injection was placed to the right wrist ECU tendon sheath.  The patient tolerated that well    2.  Will discontinue therapy until this tendinitis resolves     3. He will continue to wear his Velcro brace for activity    4.  Will follow up in 4-6 weeks for repeat evaluation

## 2023-06-14 NOTE — PROCEDURES
Tendon Sheath    Date/Time: 6/14/2023 3:40 PM  Performed by: Gómez Hernandez MD  Authorized by: Gómez Hernandez MD     Consent Done?:  Yes (Verbal)  Indications:  Pain  Site marked: the procedure site was marked    Timeout: prior to procedure the correct patient, procedure, and site was verified    Prep: patient was prepped and draped in usual sterile fashion      Location:  Wrist  : Right wrist ECU tendon sheath.  Needle size:  25 G  Medications:  40 mg triamcinolone acetonide 40 mg/mL  Patient tolerance:  Patient tolerated the procedure well with no immediate complications

## 2023-06-20 ENCOUNTER — DOCUMENTATION ONLY (OUTPATIENT)
Dept: REHABILITATION | Facility: HOSPITAL | Age: 58
End: 2023-06-20
Payer: OTHER GOVERNMENT

## 2023-06-20 NOTE — PROGRESS NOTES
Occupational Therapy Discharge Note    6/20/23    Pt was referred by Dr. Hernandez and seen for initial evaluation on 5/9/23 for R wrist PRC. Pt was seen for 9  OT visits for treatment of R wrist pain, decreased rom R wrist, decreased /pinch/ADL. Please see last note, dated 6/12/2023,  for last objective measures as well as goal achievements/modifications. Pt returned to MD due to increasing pain. MD discontinued therapy, with orders for pt to use brace and rest over the next several weeks.       Discharge OT services to SINDHU Michelle CHT

## 2023-07-10 ENCOUNTER — TELEPHONE (OUTPATIENT)
Dept: ORTHOPEDICS | Facility: CLINIC | Age: 58
End: 2023-07-10
Payer: OTHER GOVERNMENT

## 2023-07-10 NOTE — TELEPHONE ENCOUNTER
----- Message from Cathy Wood MA sent at 7/10/2023  9:10 AM CDT -----  Contact: pt  Calling on paperwork dropped off   Call back

## 2023-07-10 NOTE — TELEPHONE ENCOUNTER
Returned call to pt, informed paperwork was ready for pickup and will be left at the . Pt stated that was fine.

## 2023-07-24 ENCOUNTER — OFFICE VISIT (OUTPATIENT)
Dept: ORTHOPEDICS | Facility: CLINIC | Age: 58
End: 2023-07-24
Payer: OTHER GOVERNMENT

## 2023-07-24 VITALS — WEIGHT: 194 LBS | BODY MASS INDEX: 29.4 KG/M2 | HEIGHT: 68 IN

## 2023-07-24 DIAGNOSIS — G56.21 CUBITAL TUNNEL SYNDROME ON RIGHT: Primary | ICD-10-CM

## 2023-07-24 PROCEDURE — 99999 PR PBB SHADOW E&M-EST. PATIENT-LVL IV: ICD-10-PCS | Mod: PBBFAC,,, | Performed by: ORTHOPAEDIC SURGERY

## 2023-07-24 PROCEDURE — 99999 PR PBB SHADOW E&M-EST. PATIENT-LVL IV: CPT | Mod: PBBFAC,,, | Performed by: ORTHOPAEDIC SURGERY

## 2023-07-24 PROCEDURE — 99214 OFFICE O/P EST MOD 30 MIN: CPT | Mod: PBBFAC,PN | Performed by: ORTHOPAEDIC SURGERY

## 2023-07-24 PROCEDURE — 99213 PR OFFICE/OUTPT VISIT, EST, LEVL III, 20-29 MIN: ICD-10-PCS | Mod: S$PBB,,, | Performed by: ORTHOPAEDIC SURGERY

## 2023-07-24 PROCEDURE — 99213 OFFICE O/P EST LOW 20 MIN: CPT | Mod: S$PBB,,, | Performed by: ORTHOPAEDIC SURGERY

## 2023-07-24 RX ORDER — ESCITALOPRAM OXALATE 20 MG/1
1 TABLET ORAL DAILY
COMMUNITY
Start: 2022-09-19

## 2023-07-24 RX ORDER — BUPROPION HYDROCHLORIDE 150 MG/1
1 TABLET ORAL DAILY
COMMUNITY
Start: 2022-09-19

## 2023-07-24 RX ORDER — EPINEPHRINE 0.3 MG/.3ML
INJECTION SUBCUTANEOUS
COMMUNITY
Start: 2022-08-29

## 2023-07-24 RX ORDER — METOPROLOL SUCCINATE 50 MG/1
25 TABLET, EXTENDED RELEASE ORAL
COMMUNITY
Start: 2022-08-29

## 2023-07-24 RX ORDER — MELOXICAM 15 MG/1
15 TABLET ORAL DAILY
Qty: 14 TABLET | Refills: 0 | Status: SHIPPED | OUTPATIENT
Start: 2023-07-24 | End: 2023-08-07

## 2023-07-24 RX ORDER — ASPIRIN 81 MG/1
81 TABLET ORAL
COMMUNITY
Start: 2022-08-29

## 2023-07-24 RX ORDER — ROSUVASTATIN CALCIUM 40 MG/1
20 TABLET, COATED ORAL
COMMUNITY
Start: 2022-08-29

## 2023-07-24 RX ORDER — INSULIN GLARGINE-YFGN 100 [IU]/ML
INJECTION, SOLUTION SUBCUTANEOUS
COMMUNITY
Start: 2022-08-29 | End: 2024-02-19

## 2023-07-24 NOTE — PROGRESS NOTES
Mr Peters returns to clinic today.  He is status post right wrist proximal carpectomy.  He continued to have pain about the right wrist mainly on the ulnar side with extension of the wrist.  He was injected for possible ECU tendinitis which he states did not provide him with significant relief.      Physical exam:  Examination the right wrist reveals that the incision is well healed.  There is minimal edema.  There is no erythema.  Palpation does produce tenderness overlying the radial carpal joint mainly on the ulnar side of the level of the lunate fossa and ulnar tip.  There is no significant tenderness over the distal radial ulnar joint.  He has no volar tenderness noted.  Extension of the wrist is limited with pain upon extension.  He does report intact sensation in the median radial ulnar distribution.  He does have mild decreased sensation with some paresthesias in the ulnar distribution.  He has negative Tinel's overlying Guyon's canal but has a positive Tinel's overlying the cubital tunnel     Assessment:  Right wrist arthritis status post proximal row carpectomy, right cubital tunnel syndrome     Plan:    1. We have had long discussion today about the possibility of wrist arthrodesis.  At this point will continue his wrist brace and allow him some time to think about his treatment options which could include injections to the wrist versus arthrodesis.    2.  Will start him on nighttime splint wearing for the cubital tunnel    3.  Will provide him with a short course of Mobic to see if we can settle down his ulnar nerve.  I have discussed the need to discontinue it if he is starting have any bleeding dysfunction    4. Will follow up with me in 4-6 weeks at which point we will make a more formal decision about what to do with his wrist

## 2023-08-30 ENCOUNTER — OFFICE VISIT (OUTPATIENT)
Dept: ORTHOPEDICS | Facility: CLINIC | Age: 58
End: 2023-08-30
Payer: OTHER GOVERNMENT

## 2023-08-30 DIAGNOSIS — M19.031 ARTHRITIS OF RIGHT WRIST: Primary | ICD-10-CM

## 2023-08-30 PROCEDURE — 99999 PR PBB SHADOW E&M-EST. PATIENT-LVL III: CPT | Mod: PBBFAC,,, | Performed by: ORTHOPAEDIC SURGERY

## 2023-08-30 PROCEDURE — 99999 PR PBB SHADOW E&M-EST. PATIENT-LVL III: ICD-10-PCS | Mod: PBBFAC,,, | Performed by: ORTHOPAEDIC SURGERY

## 2023-08-30 PROCEDURE — 99213 OFFICE O/P EST LOW 20 MIN: CPT | Mod: PBBFAC,PN,25 | Performed by: ORTHOPAEDIC SURGERY

## 2023-08-30 PROCEDURE — 20605 INTERMEDIATE JOINT ASPIRATION/INJECTION: R RADIOCARPAL: ICD-10-PCS | Mod: S$PBB,RT,, | Performed by: ORTHOPAEDIC SURGERY

## 2023-08-30 PROCEDURE — 99999PBSHW PR PBB SHADOW TECHNICAL ONLY FILED TO HB: Mod: PBBFAC,,,

## 2023-08-30 PROCEDURE — 20605 DRAIN/INJ JOINT/BURSA W/O US: CPT | Mod: PBBFAC,PN,RT | Performed by: ORTHOPAEDIC SURGERY

## 2023-08-30 PROCEDURE — 99999PBSHW PR PBB SHADOW TECHNICAL ONLY FILED TO HB: ICD-10-PCS | Mod: PBBFAC,,,

## 2023-08-30 PROCEDURE — 99213 OFFICE O/P EST LOW 20 MIN: CPT | Mod: S$PBB,25,, | Performed by: ORTHOPAEDIC SURGERY

## 2023-08-30 PROCEDURE — 99213 PR OFFICE/OUTPT VISIT, EST, LEVL III, 20-29 MIN: ICD-10-PCS | Mod: S$PBB,25,, | Performed by: ORTHOPAEDIC SURGERY

## 2023-08-30 RX ADMIN — TRIAMCINOLONE ACETONIDE 40 MG: 40 INJECTION, SUSPENSION INTRA-ARTICULAR; INTRAMUSCULAR at 02:08

## 2023-08-30 NOTE — PROGRESS NOTES
Mr. Peters returns to clinic today.  Has a history of right wrist proximal row carpectomy.  He is continuing to have pain.  He states that the majority of the pain is over on the ulnar side of his wrist.  We have attempted to splint him but he continues to be sore.  We have discussed the possibility of surgical treatments     Physical exam:  Examination of the right wrist and hand reveals that the incision is well healed.  There is no edema or erythema.  Palpation does produce tenderness over the region of the radial carpal joint as well as over the region of the pisotriquetral joint/volar attachment of the TFCC.  Range of motion of the wrist is limited and does have pain was specifically with ulnar deviation.  He is neurovascularly intact distally     Assessment: Status post right wrist proximal row carpectomy with continued pain     Plan:    1. I again discussed treatment with the patient.  I have discussed the possibility of surgical interventions which would most likely be wrist arthrodesis.  I also discussed continuing to treat the symptoms.  At this time we will continue  with conservative treatments.    2.  Steroid injection was placed to the right wrist today.  The patient tolerated these well     3. He will follow up with me in 3 months for repeat evaluation

## 2023-08-31 ENCOUNTER — TELEPHONE (OUTPATIENT)
Dept: ORTHOPEDICS | Facility: CLINIC | Age: 58
End: 2023-08-31
Payer: OTHER GOVERNMENT

## 2023-08-31 NOTE — TELEPHONE ENCOUNTER
----- Message from Cathy Wood MA sent at 8/31/2023  8:05 AM CDT -----  Contact: pt  Wants Dr notes in order to sent to insurance company   Call back

## 2023-08-31 NOTE — TELEPHONE ENCOUNTER
Returned call to pt, pt stated he needs office note completed to be able to send to insurance company. Informed once  completes office note he will be able to get off [employer]. Pt stated that was fine.

## 2023-09-01 ENCOUNTER — TELEPHONE (OUTPATIENT)
Dept: ORTHOPEDICS | Facility: CLINIC | Age: 58
End: 2023-09-01
Payer: OTHER GOVERNMENT

## 2023-09-06 RX ORDER — TRIAMCINOLONE ACETONIDE 40 MG/ML
40 INJECTION, SUSPENSION INTRA-ARTICULAR; INTRAMUSCULAR
Status: DISCONTINUED | OUTPATIENT
Start: 2023-08-30 | End: 2023-09-06 | Stop reason: HOSPADM

## 2023-09-06 NOTE — PROCEDURES
Intermediate Joint Aspiration/Injection: R radiocarpal    Date/Time: 8/30/2023 2:20 PM    Performed by: Gómez Hernandez MD  Authorized by: Gómez Hernandez MD    Consent Done?:  Yes (Verbal)  Indications:  Arthritis and pain  Site marked: The procedure site was marked      Location:  Wrist  Site:  R radiocarpal  Prep: Patient was prepped and draped in usual sterile fashion    Needle size:  25 G  Medications:  40 mg triamcinolone acetonide 40 mg/mL  Patient tolerance:  Patient tolerated the procedure well with no immediate complications

## 2023-11-17 ENCOUNTER — OFFICE VISIT (OUTPATIENT)
Dept: ORTHOPEDICS | Facility: CLINIC | Age: 58
End: 2023-11-17
Payer: OTHER GOVERNMENT

## 2023-11-17 DIAGNOSIS — M19.131 SLAC (SCAPHOLUNATE ADVANCED COLLAPSE) OF WRIST, RIGHT: Primary | ICD-10-CM

## 2023-11-17 PROCEDURE — 20605 DRAIN/INJ JOINT/BURSA W/O US: CPT | Mod: PBBFAC,PO,RT | Performed by: ORTHOPAEDIC SURGERY

## 2023-11-17 PROCEDURE — 20605 INTERMEDIATE JOINT ASPIRATION/INJECTION: R RADIOCARPAL: ICD-10-PCS | Mod: S$PBB,RT,, | Performed by: ORTHOPAEDIC SURGERY

## 2023-11-17 PROCEDURE — 99213 OFFICE O/P EST LOW 20 MIN: CPT | Mod: PBBFAC,PO,25 | Performed by: ORTHOPAEDIC SURGERY

## 2023-11-17 PROCEDURE — 99213 PR OFFICE/OUTPT VISIT, EST, LEVL III, 20-29 MIN: ICD-10-PCS | Mod: S$PBB,25,, | Performed by: ORTHOPAEDIC SURGERY

## 2023-11-17 PROCEDURE — 99999 PR PBB SHADOW E&M-EST. PATIENT-LVL III: ICD-10-PCS | Mod: PBBFAC,,, | Performed by: ORTHOPAEDIC SURGERY

## 2023-11-17 PROCEDURE — 99213 OFFICE O/P EST LOW 20 MIN: CPT | Mod: S$PBB,25,, | Performed by: ORTHOPAEDIC SURGERY

## 2023-11-17 PROCEDURE — 99999PBSHW PR PBB SHADOW TECHNICAL ONLY FILED TO HB: Mod: PBBFAC,,,

## 2023-11-17 PROCEDURE — 99999 PR PBB SHADOW E&M-EST. PATIENT-LVL III: CPT | Mod: PBBFAC,,, | Performed by: ORTHOPAEDIC SURGERY

## 2023-11-17 PROCEDURE — 99999PBSHW PR PBB SHADOW TECHNICAL ONLY FILED TO HB: ICD-10-PCS | Mod: PBBFAC,,,

## 2023-11-17 RX ORDER — TRIAMCINOLONE ACETONIDE 40 MG/ML
40 INJECTION, SUSPENSION INTRA-ARTICULAR; INTRAMUSCULAR
Status: DISCONTINUED | OUTPATIENT
Start: 2023-11-17 | End: 2023-11-17 | Stop reason: HOSPADM

## 2023-11-17 RX ADMIN — TRIAMCINOLONE ACETONIDE 40 MG: 40 INJECTION, SUSPENSION INTRA-ARTICULAR; INTRAMUSCULAR at 11:11

## 2023-11-17 NOTE — PROCEDURES
Intermediate Joint Aspiration/Injection: R radiocarpal    Date/Time: 11/17/2023 11:40 AM    Performed by: Gómez Hernandez MD  Authorized by: Gómez Hernandez MD    Consent Done?:  Yes (Verbal)  Indications:  Arthritis and pain  Site marked: The procedure site was marked    Timeout: Prior to procedure the correct patient, procedure, and site was verified      Location:  Wrist  Site:  R radiocarpal  Prep: Patient was prepped and draped in usual sterile fashion    Needle size:  25 G  Medications:  40 mg triamcinolone acetonide 40 mg/mL  Patient tolerance:  Patient tolerated the procedure well with no immediate complications

## 2023-11-17 NOTE — PROGRESS NOTES
Mr Peters returns to clinic today.  Has a history of a right wrist proximal row carpectomy.  He states that he is continuing to have soreness in his wrist specifically with activities without his brace.  States that the injections do help.      Physical exam:  Examination of the right wrist reveals that the incision is well healed.  There is no significant edema.  Palpation produces mild tenderness over the ulnar side of the wrist.  Range of motion is extension of 50° and flexion of 40°.  He is neurovascularly intact over the fingers     Assessment: Status post right wrist proximal row carpectomy     Plan:    1. We discuss possible treatments.  At this point will continue conservative treatments    2.  After informed consent was obtained injection was placed to the right wrist     3. Will follow up with me on March 18, 2024 at which point we will repeat visit his options which at this point are continuing conservative treatments and injections versus wrist arthrodesis

## 2024-01-25 ENCOUNTER — TELEPHONE (OUTPATIENT)
Dept: ORTHOPEDICS | Facility: CLINIC | Age: 59
End: 2024-01-25
Payer: OTHER GOVERNMENT

## 2024-01-25 NOTE — TELEPHONE ENCOUNTER
Returned call back to pt, no answer. Unable to leave [facility] in regards to medical info for continued benefits.

## 2024-01-26 ENCOUNTER — TELEPHONE (OUTPATIENT)
Dept: ORTHOPEDICS | Facility: CLINIC | Age: 59
End: 2024-01-26
Payer: OTHER GOVERNMENT

## 2024-01-26 NOTE — TELEPHONE ENCOUNTER
----- Message from Leah Lafleur sent at 1/26/2024 12:07 PM CST -----  Contact: Patient  Type:  Patient Returning Call    Who Called:  Self  Who Left Message for Patient:   GREGORIOSILVERIO APODACAT  Does the patient know what this is regarding?:  he is in town and wants to know if his paperwork is ready to be picked up   Best Call Back Number:  [phone]  Additional Information:    Please call the patient back at the phone number listed above to advise. Thank you!

## 2024-03-05 ENCOUNTER — TELEPHONE (OUTPATIENT)
Dept: ORTHOPEDICS | Facility: CLINIC | Age: 59
End: 2024-03-05
Payer: OTHER GOVERNMENT

## 2024-03-05 NOTE — TELEPHONE ENCOUNTER
Called and spoke with patient regarding his upcoming appointmen for injections.  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 I did stated that we would need cardiac clearance before he can began injections again.  Patiet understood and stated he would get that.  Patient dropped off disability paperwork for us downstairs.  I stated I would get it, and well get it filled out.

## 2024-03-07 ENCOUNTER — TELEPHONE (OUTPATIENT)
Dept: ORTHOPEDICS | Facility: CLINIC | Age: 59
End: 2024-03-07
Payer: OTHER GOVERNMENT

## 2024-03-07 NOTE — TELEPHONE ENCOUNTER
Called pt to inform paperwork is ready to be picked up, no answer. Unable to leave [facility].

## 2024-05-01 ENCOUNTER — OFFICE VISIT (OUTPATIENT)
Dept: ORTHOPEDICS | Facility: CLINIC | Age: 59
End: 2024-05-01
Payer: OTHER GOVERNMENT

## 2024-05-01 VITALS — WEIGHT: 205 LBS | HEIGHT: 68 IN | BODY MASS INDEX: 31.07 KG/M2

## 2024-05-01 DIAGNOSIS — M19.131 SLAC (SCAPHOLUNATE ADVANCED COLLAPSE) OF WRIST, RIGHT: Primary | ICD-10-CM

## 2024-05-01 PROCEDURE — 99999 PR PBB SHADOW E&M-EST. PATIENT-LVL IV: CPT | Mod: PBBFAC,,, | Performed by: ORTHOPAEDIC SURGERY

## 2024-05-01 PROCEDURE — 99213 OFFICE O/P EST LOW 20 MIN: CPT | Mod: S$PBB,,, | Performed by: ORTHOPAEDIC SURGERY

## 2024-05-01 PROCEDURE — 99214 OFFICE O/P EST MOD 30 MIN: CPT | Mod: PBBFAC,PO | Performed by: ORTHOPAEDIC SURGERY

## 2024-05-01 NOTE — PROCEDURES
Intermediate Joint Aspiration/Injection: R radiocarpal    Date/Time: 5/1/2024 11:20 AM    Performed by: Gómez Hernandez MD  Authorized by: Gómez Hernandez MD    Consent Done?:  Yes (Verbal)  Indications:  Arthritis and pain  Site marked: The procedure site was marked    Timeout: Prior to procedure the correct patient, procedure, and site was verified      Location:  Wrist  Site:  R radiocarpal  Prep: Patient was prepped and draped in usual sterile fashion    Medications:  40 mg triamcinolone acetonide 40 mg/mL  Patient tolerance:  Patient tolerated the procedure well with no immediate complications

## 2024-05-01 NOTE — PROGRESS NOTES
Mr Peters returns to clinic today.  Has a history of right wrist pain and arthritis.  He has had a proximal row carpectomy.  He continues to have pain mainly over the ulnar side of the wrist.  He states that his last injection did give him some relief     Physical exam:  Examination of the right wrist reveals that the incision is well healed.  There is no significant edema.  Palpation does produce tenderness mainly over the ulnar side of the wrist near the styloid.  Wrist range of motion is limited with extension of 50° and flexion of 50°.  He is able to pronate and supinate     Assessment: Right wrist arthritis with proximal row carpectomy     Plan:    1.  I have discussed treatment going forward.  At this time will place a steroid injection     2.  We will hold off on a steroid injection for today as he underwent bypass surgery 7 weeks ago    3.  Will follow up in 4-6 weeks for repeat evaluation at which point we may consider steroid injection in his right wrist

## 2024-06-12 ENCOUNTER — HOSPITAL ENCOUNTER (OUTPATIENT)
Dept: RADIOLOGY | Facility: HOSPITAL | Age: 59
Discharge: HOME | End: 2024-06-12
Attending: ORTHOPAEDIC SURGERY
Payer: OTHER GOVERNMENT

## 2024-06-12 ENCOUNTER — OFFICE VISIT (OUTPATIENT)
Dept: ORTHOPEDICS | Facility: CLINIC | Age: 59
End: 2024-06-12
Payer: OTHER GOVERNMENT

## 2024-06-12 DIAGNOSIS — M19.131 SLAC (SCAPHOLUNATE ADVANCED COLLAPSE) OF WRIST, RIGHT: ICD-10-CM

## 2024-06-12 DIAGNOSIS — M25.531 PAIN IN RIGHT WRIST: Primary | ICD-10-CM

## 2024-06-12 DIAGNOSIS — M25.531 PAIN IN RIGHT WRIST: ICD-10-CM

## 2024-06-12 PROCEDURE — 99213 OFFICE O/P EST LOW 20 MIN: CPT | Mod: PBBFAC,25,PO | Performed by: ORTHOPAEDIC SURGERY

## 2024-06-12 PROCEDURE — 99999PBSHW PR PBB SHADOW TECHNICAL ONLY FILED TO HB: Mod: PBBFAC,,,

## 2024-06-12 PROCEDURE — 73110 X-RAY EXAM OF WRIST: CPT | Mod: TC,PO,RT

## 2024-06-12 PROCEDURE — 20605 DRAIN/INJ JOINT/BURSA W/O US: CPT | Mod: PBBFAC,PO,RT | Performed by: ORTHOPAEDIC SURGERY

## 2024-06-12 PROCEDURE — 99999 PR PBB SHADOW E&M-EST. PATIENT-LVL III: CPT | Mod: PBBFAC,,, | Performed by: ORTHOPAEDIC SURGERY

## 2024-06-12 PROCEDURE — 73110 X-RAY EXAM OF WRIST: CPT | Mod: 26,RT,, | Performed by: RADIOLOGY

## 2024-06-12 PROCEDURE — 99213 OFFICE O/P EST LOW 20 MIN: CPT | Mod: S$PBB,25,, | Performed by: ORTHOPAEDIC SURGERY

## 2024-06-12 RX ORDER — TRIAMCINOLONE ACETONIDE 40 MG/ML
40 INJECTION, SUSPENSION INTRA-ARTICULAR; INTRAMUSCULAR
Status: DISCONTINUED | OUTPATIENT
Start: 2024-06-12 | End: 2024-06-12 | Stop reason: HOSPADM

## 2024-06-12 RX ADMIN — TRIAMCINOLONE ACETONIDE 40 MG: 40 INJECTION, SUSPENSION INTRA-ARTICULAR; INTRAMUSCULAR at 11:06

## 2024-06-12 NOTE — PROGRESS NOTES
Mr. Peters returns to clinic today.  Has a history of a right proximal row carpectomy.  He was still having pain about the wrist which is mainly over its ulnar side.  He was here today to discuss further treatment     Physical exam:  Examination of the right wrist reveals that the incision is well healed.  There is no significant edema.  Palpation does produce tenderness over the ulnar head and ulnar side of the wrist.  He does not have significant radial sided tenderness.  Wrist range of motion is extension of 45° and flexion of 50°.  He was able to pronate and supinate.    Radiology: X-rays of the right wrist were taken in clinic today there is noted to be evidence of the proximal row carpectomy.  The some widening of the distal radial ulnar joint.  With osteophytes noted.  He also has what appears to be some impingement of the tip of the ulnar styloid on the pisiform.    Assessment:  Status post right wrist proximal row carpectomy with ulnar-sided pain     Plan:     1. We have discussed treatment going forward.  We have discussed the possibility of wrist arthrodesis versus ulnar shortening osteotomy versus symptomatic treatment which will include steroid injections.  At this time he would like to hold off on surgical procedures and therefore we will attempt an injection     2. After consent was obtained injection was placed to the right wrist ulnar carpal joint.      3.  He will follow up in 2-3 months for repeat evaluation

## 2024-09-16 ENCOUNTER — OFFICE VISIT (OUTPATIENT)
Dept: ORTHOPEDICS | Facility: CLINIC | Age: 59
End: 2024-09-16
Payer: OTHER GOVERNMENT

## 2024-09-16 VITALS — HEIGHT: 68 IN | BODY MASS INDEX: 31.07 KG/M2 | WEIGHT: 205 LBS

## 2024-09-16 DIAGNOSIS — M19.131 SLAC (SCAPHOLUNATE ADVANCED COLLAPSE) OF WRIST, RIGHT: Primary | ICD-10-CM

## 2024-09-16 PROCEDURE — 99999 PR PBB SHADOW E&M-EST. PATIENT-LVL III: CPT | Mod: PBBFAC,,, | Performed by: ORTHOPAEDIC SURGERY

## 2024-09-16 PROCEDURE — 99213 OFFICE O/P EST LOW 20 MIN: CPT | Mod: PBBFAC,PO | Performed by: ORTHOPAEDIC SURGERY

## 2024-09-16 PROCEDURE — 99213 OFFICE O/P EST LOW 20 MIN: CPT | Mod: S$PBB,25,, | Performed by: ORTHOPAEDIC SURGERY

## 2024-09-16 PROCEDURE — 99999PBSHW PR PBB SHADOW TECHNICAL ONLY FILED TO HB: Mod: PBBFAC,,,

## 2024-09-16 PROCEDURE — 20605 DRAIN/INJ JOINT/BURSA W/O US: CPT | Mod: PBBFAC,PO | Performed by: ORTHOPAEDIC SURGERY

## 2024-09-16 RX ORDER — TRIAMCINOLONE ACETONIDE 40 MG/ML
40 INJECTION, SUSPENSION INTRA-ARTICULAR; INTRAMUSCULAR
Status: DISCONTINUED | OUTPATIENT
Start: 2024-09-16 | End: 2024-09-16 | Stop reason: HOSPADM

## 2024-09-16 RX ADMIN — TRIAMCINOLONE ACETONIDE 40 MG: 40 INJECTION, SUSPENSION INTRA-ARTICULAR; INTRAMUSCULAR at 11:09

## 2024-09-16 NOTE — PROCEDURES
Intermediate Joint Aspiration/Injection: R radiocarpal    Date/Time: 9/16/2024 11:00 AM    Performed by: Gómez Hernandez MD  Authorized by: Gómez Hernandez MD    Consent Done?:  Yes (Verbal)  Indications:  Arthritis and pain  Site marked: The procedure site was marked    Timeout: Prior to procedure the correct patient, procedure, and site was verified      Location:  Wrist  Site:  R radiocarpal  Prep: Patient was prepped and draped in usual sterile fashion    Needle size:  25 G  Approach:  Dorsal  Medications:  40 mg triamcinolone acetonide 40 mg/mL  Patient tolerance:  Patient tolerated the procedure well with no immediate complications

## 2024-09-16 NOTE — PROGRESS NOTES
Mr Peters returns to clinic today.  Has a history of right wrist proximal row carpectomy as well as ulnar impaction syndrome.  We did inject his wrist at his last visit.  He did very well for the injection     Physical exam: Examination of the right wrist reveals that the incision is well healed.  There was minimal edema.  Palpation produces mild tenderness over the ulnar side of the wrist.  He was able to flex and extend.  He does have sensation intact.      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 After consent was obtained repeat injection was placed to the right wrist ulnar carpal joint     2.  He will follow up with me as needed     3. We have discussed treatment going forward.  I have discussed the possibility of wrist arthrodesis versus ulnar shortening in the future.  States that if he continues to have a significant amount of pain we may consider an ulnar shortening osteotomy

## (undated) DEVICE — TUBING SUC UNIV W/CONN 12FT

## (undated) DEVICE — RUBBERBAND STERILE 3X1/8IN

## (undated) DEVICE — BANDAGE ESMARK LATEX FREE 4INX

## (undated) DEVICE — DRAPE HALF SURGICAL 40X58IN

## (undated) DEVICE — DRAPE STERI-DRAPE 1000 17X11IN

## (undated) DEVICE — DRAPE HAND STERILE

## (undated) DEVICE — SPLINT PLASTER FAST SET 5X30IN

## (undated) DEVICE — TOURNIQUET SB QC DP 18X4IN

## (undated) DEVICE — NDL HYPO REG 25G X 1 1/2

## (undated) DEVICE — SUT MONOCRYL 4-0 PS-2

## (undated) DEVICE — SUT 2-0 VICRYL / SH (J417)

## (undated) DEVICE — PAD CAST SPECIALIST STRL 4

## (undated) DEVICE — STRAP OR TABLE 5IN X 72IN

## (undated) DEVICE — ELECTRODE REM PLYHSV RETURN 9

## (undated) DEVICE — PENCIL ROCKER SWITCH 10FT CORD

## (undated) DEVICE — KIT SAHARA DRAPE DRAW/LIFT

## (undated) DEVICE — DRAPE C ARM 42 X 120 10/BX

## (undated) DEVICE — GLOVE PROTEXIS LTX MICRO 8

## (undated) DEVICE — BANDAGE MATRIX HK LOOP 4IN 5YD

## (undated) DEVICE — GOWN X-LG STERILE BACK

## (undated) DEVICE — APPLICATOR CHLORAPREP ORN 26ML

## (undated) DEVICE — SUT PASSER SWANSON

## (undated) DEVICE — DRAPE THREE-QTR REINF 53X77IN

## (undated) DEVICE — FORCEP STRAIGHT DISP

## (undated) DEVICE — DRAPE U SPLIT SHEET 54X76IN

## (undated) DEVICE — CORD BIPOLAR 12 FOOT

## (undated) DEVICE — K-WIRE TROC PNT 2X150MM SS
Type: IMPLANTABLE DEVICE | Site: WRIST | Status: NON-FUNCTIONAL
Removed: 2023-03-07

## (undated) DEVICE — DRESSING GAUZE XEROFORM 5X9

## (undated) DEVICE — SYR 10CC LUER LOCK

## (undated) DEVICE — SEE L#120831

## (undated) DEVICE — TUBIGRIP 10M SZ E

## (undated) DEVICE — DRESSING TRANS 2X2 TEGADERM

## (undated) DEVICE — YANKAUER OPEN TIP W/O VENT

## (undated) DEVICE — ALCOHOL 70% ISOP RUBBING 4OZ

## (undated) DEVICE — DRESSING TRANS 4X4 TEGADERM

## (undated) DEVICE — Device

## (undated) DEVICE — GLOVE PROTEXIS LTX MICRO  7.5